# Patient Record
Sex: FEMALE | Race: BLACK OR AFRICAN AMERICAN | NOT HISPANIC OR LATINO | Employment: OTHER | URBAN - METROPOLITAN AREA
[De-identification: names, ages, dates, MRNs, and addresses within clinical notes are randomized per-mention and may not be internally consistent; named-entity substitution may affect disease eponyms.]

---

## 2023-03-07 LAB
ALANINE AMINOTRANSFERASE (SGPT) (U/L) IN SER/PLAS: 10 U/L (ref 7–45)
ALBUMIN (G/DL) IN SER/PLAS: 4.3 G/DL (ref 3.4–5)
ALKALINE PHOSPHATASE (U/L) IN SER/PLAS: 97 U/L (ref 33–136)
ANION GAP IN SER/PLAS: 10 MMOL/L (ref 10–20)
APPEARANCE, URINE: ABNORMAL
ASPARTATE AMINOTRANSFERASE (SGOT) (U/L) IN SER/PLAS: 17 U/L (ref 9–39)
BACTERIA, URINE: ABNORMAL /HPF
BILIRUBIN TOTAL (MG/DL) IN SER/PLAS: 0.3 MG/DL (ref 0–1.2)
BILIRUBIN, URINE: NEGATIVE
BLOOD, URINE: NEGATIVE
CALCIDIOL (25 OH VITAMIN D3) (NG/ML) IN SER/PLAS: 20 NG/ML
CALCIUM (MG/DL) IN SER/PLAS: 9.7 MG/DL (ref 8.6–10.3)
CARBON DIOXIDE, TOTAL (MMOL/L) IN SER/PLAS: 27 MMOL/L (ref 21–32)
CHLORIDE (MMOL/L) IN SER/PLAS: 107 MMOL/L (ref 98–107)
CHOLESTEROL (MG/DL) IN SER/PLAS: 193 MG/DL (ref 0–199)
CHOLESTEROL IN HDL (MG/DL) IN SER/PLAS: 68.4 MG/DL
CHOLESTEROL/HDL RATIO: 2.8
COLOR, URINE: YELLOW
CREATININE (MG/DL) IN SER/PLAS: 0.69 MG/DL (ref 0.5–1.05)
ERYTHROCYTE DISTRIBUTION WIDTH (RATIO) BY AUTOMATED COUNT: 19.8 % (ref 11.5–14.5)
ERYTHROCYTE MEAN CORPUSCULAR HEMOGLOBIN CONCENTRATION (G/DL) BY AUTOMATED: 28.2 G/DL (ref 32–36)
ERYTHROCYTE MEAN CORPUSCULAR VOLUME (FL) BY AUTOMATED COUNT: 74 FL (ref 80–100)
ERYTHROCYTES (10*6/UL) IN BLOOD BY AUTOMATED COUNT: 4.71 X10E12/L (ref 4–5.2)
GFR FEMALE: >90 ML/MIN/1.73M2
GLUCOSE (MG/DL) IN SER/PLAS: 87 MG/DL (ref 74–99)
GLUCOSE, URINE: NEGATIVE MG/DL
HEMATOCRIT (%) IN BLOOD BY AUTOMATED COUNT: 34.8 % (ref 36–46)
HEMOGLOBIN (G/DL) IN BLOOD: 9.8 G/DL (ref 12–16)
HYALINE CASTS, URINE: ABNORMAL /LPF
KETONES, URINE: NEGATIVE MG/DL
LDL: 111 MG/DL (ref 0–99)
LEUKOCYTE ESTERASE, URINE: NEGATIVE
LEUKOCYTES (10*3/UL) IN BLOOD BY AUTOMATED COUNT: 5.5 X10E9/L (ref 4.4–11.3)
NITRITE, URINE: NEGATIVE
PH, URINE: 5 (ref 5–8)
PLATELETS (10*3/UL) IN BLOOD AUTOMATED COUNT: 394 X10E9/L (ref 150–450)
POTASSIUM (MMOL/L) IN SER/PLAS: 3.9 MMOL/L (ref 3.5–5.3)
PROTEIN TOTAL: 7.5 G/DL (ref 6.4–8.2)
PROTEIN, URINE: ABNORMAL MG/DL
RBC, URINE: ABNORMAL /HPF (ref 0–5)
SODIUM (MMOL/L) IN SER/PLAS: 140 MMOL/L (ref 136–145)
SPECIFIC GRAVITY, URINE: 1.01 (ref 1–1.03)
SQUAMOUS EPITHELIAL CELLS, URINE: 1 /HPF
THYROTROPIN (MIU/L) IN SER/PLAS BY DETECTION LIMIT <= 0.05 MIU/L: 0.51 MIU/L (ref 0.44–3.98)
TRIGLYCERIDE (MG/DL) IN SER/PLAS: 69 MG/DL (ref 0–149)
UREA NITROGEN (MG/DL) IN SER/PLAS: 13 MG/DL (ref 6–23)
UROBILINOGEN, URINE: <2 MG/DL (ref 0–1.9)
VLDL: 14 MG/DL (ref 0–40)
WBC, URINE: 1 /HPF (ref 0–5)

## 2023-03-08 LAB
ESTIMATED AVERAGE GLUCOSE FOR HBA1C: 111 MG/DL
HEMOGLOBIN A1C/HEMOGLOBIN TOTAL IN BLOOD: 5.5 %

## 2023-10-08 PROBLEM — J34.89 NASAL LESION: Status: ACTIVE | Noted: 2023-10-08

## 2023-10-08 PROBLEM — J34.2 NASAL SEPTAL DEVIATION: Status: ACTIVE | Noted: 2023-10-08

## 2023-10-08 PROBLEM — J34.89 NASAL OBSTRUCTION: Status: ACTIVE | Noted: 2023-10-08

## 2023-10-08 PROBLEM — J34.3 HYPERTROPHY OF BOTH INFERIOR NASAL TURBINATES: Status: ACTIVE | Noted: 2023-10-08

## 2023-10-08 PROBLEM — J32.9 CHRONIC RHINOSINUSITIS: Status: ACTIVE | Noted: 2023-10-08

## 2023-10-08 PROBLEM — R44.8 FACIAL PRESSURE: Status: ACTIVE | Noted: 2023-10-08

## 2023-10-08 PROBLEM — J34.89 CONCHA BULLOSA: Status: ACTIVE | Noted: 2023-10-08

## 2023-10-08 RX ORDER — TOPIRAMATE 50 MG/1
50 TABLET, FILM COATED ORAL 2 TIMES DAILY
COMMUNITY
Start: 2020-09-24 | End: 2024-02-23 | Stop reason: ALTCHOICE

## 2023-10-08 RX ORDER — CETIRIZINE HYDROCHLORIDE 10 MG/1
10 TABLET ORAL DAILY
COMMUNITY
Start: 2022-02-17

## 2023-10-08 RX ORDER — PANTOPRAZOLE SODIUM 40 MG/1
40 TABLET, DELAYED RELEASE ORAL DAILY
COMMUNITY
Start: 2019-07-03

## 2023-10-08 RX ORDER — TIZANIDINE 4 MG/1
4 TABLET ORAL DAILY
COMMUNITY
Start: 2019-01-02 | End: 2023-11-10 | Stop reason: SDUPTHER

## 2023-10-08 RX ORDER — MONTELUKAST SODIUM 10 MG/1
10 TABLET ORAL DAILY
COMMUNITY
Start: 2022-02-15

## 2023-10-08 RX ORDER — VARENICLINE TARTRATE 0.5 (11)-1
KIT ORAL
COMMUNITY
Start: 2021-12-14 | End: 2024-02-23 | Stop reason: ALTCHOICE

## 2023-10-08 RX ORDER — CHOLECALCIFEROL (VITAMIN D3) 50 MCG
2000 TABLET ORAL DAILY
COMMUNITY
Start: 2018-10-04

## 2023-10-08 RX ORDER — GABAPENTIN 800 MG/1
800 TABLET ORAL 3 TIMES DAILY
COMMUNITY
Start: 2022-02-15 | End: 2023-11-10 | Stop reason: DRUGHIGH

## 2023-10-08 RX ORDER — TRAMADOL HYDROCHLORIDE 50 MG/1
50 TABLET ORAL 2 TIMES DAILY PRN
COMMUNITY
Start: 2022-03-28 | End: 2023-10-10 | Stop reason: SDUPTHER

## 2023-10-08 RX ORDER — TEMAZEPAM 15 MG/1
CAPSULE ORAL NIGHTLY
COMMUNITY
Start: 2018-10-04 | End: 2024-03-26 | Stop reason: SDUPTHER

## 2023-10-08 RX ORDER — ALBUTEROL SULFATE 90 UG/1
2 AEROSOL, METERED RESPIRATORY (INHALATION) EVERY 6 HOURS PRN
COMMUNITY
Start: 2020-07-24

## 2023-10-08 RX ORDER — OXYBUTYNIN CHLORIDE 5 MG/1
5 TABLET, EXTENDED RELEASE ORAL DAILY
COMMUNITY
Start: 2018-10-04

## 2023-10-08 RX ORDER — SOD CHLOR,BICARB/SQUEEZ BOTTLE
PACKET, WITH RINSE DEVICE NASAL 2 TIMES DAILY
COMMUNITY
Start: 2022-03-29

## 2023-10-08 RX ORDER — NAPROXEN 500 MG
1 KIT MISCELLANEOUS 2 TIMES DAILY
COMMUNITY
Start: 2022-12-04

## 2023-10-08 RX ORDER — FERROUS SULFATE 325(65) MG
TABLET ORAL DAILY
COMMUNITY
Start: 2018-10-04

## 2023-10-08 RX ORDER — LIDOCAINE 50 MG/G
1 PATCH TOPICAL DAILY
COMMUNITY
Start: 2022-12-04

## 2023-10-08 RX ORDER — FLUTICASONE PROPIONATE 50 MCG
2 SPRAY, SUSPENSION (ML) NASAL DAILY
COMMUNITY
Start: 2022-03-29

## 2023-10-08 RX ORDER — VARENICLINE TARTRATE 1 MG/1
TABLET, FILM COATED ORAL
COMMUNITY
Start: 2021-12-14 | End: 2024-05-15 | Stop reason: SDUPTHER

## 2023-10-08 RX ORDER — IBUPROFEN 200 MG
1 TABLET ORAL DAILY
COMMUNITY
Start: 2021-08-02

## 2023-10-08 RX ORDER — OXYCODONE HYDROCHLORIDE 5 MG/1
5 TABLET ORAL EVERY 4 HOURS PRN
COMMUNITY
Start: 2022-09-26 | End: 2024-04-15 | Stop reason: ALTCHOICE

## 2023-10-10 ENCOUNTER — TELEPHONE (OUTPATIENT)
Dept: PRIMARY CARE | Facility: CLINIC | Age: 63
End: 2023-10-10

## 2023-10-10 ENCOUNTER — LAB (OUTPATIENT)
Dept: LAB | Facility: LAB | Age: 63
End: 2023-10-10
Payer: MEDICAID

## 2023-10-10 ENCOUNTER — OFFICE VISIT (OUTPATIENT)
Dept: PRIMARY CARE | Facility: CLINIC | Age: 63
End: 2023-10-10
Payer: MEDICAID

## 2023-10-10 VITALS
SYSTOLIC BLOOD PRESSURE: 120 MMHG | HEART RATE: 102 BPM | WEIGHT: 95.8 LBS | DIASTOLIC BLOOD PRESSURE: 70 MMHG | TEMPERATURE: 98.1 F | HEIGHT: 65 IN | BODY MASS INDEX: 15.96 KG/M2 | OXYGEN SATURATION: 97 %

## 2023-10-10 DIAGNOSIS — M51.36 DEGENERATION, INTERVERTEBRAL DISC, LUMBAR: ICD-10-CM

## 2023-10-10 DIAGNOSIS — J43.9 PULMONARY EMPHYSEMA, UNSPECIFIED EMPHYSEMA TYPE (MULTI): Primary | ICD-10-CM

## 2023-10-10 DIAGNOSIS — M15.9 POLYARTHROSIS: ICD-10-CM

## 2023-10-10 DIAGNOSIS — M17.0 OSTEOARTHRITIS OF BOTH KNEES, UNSPECIFIED OSTEOARTHRITIS TYPE: ICD-10-CM

## 2023-10-10 DIAGNOSIS — D50.9 IRON DEFICIENCY ANEMIA, UNSPECIFIED: Primary | ICD-10-CM

## 2023-10-10 LAB
ERYTHROCYTE [DISTWIDTH] IN BLOOD BY AUTOMATED COUNT: 23.9 % (ref 11.5–14.5)
FERRITIN SERPL-MCNC: 25 NG/ML (ref 8–150)
HCT VFR BLD AUTO: 42.3 % (ref 36–46)
HGB BLD-MCNC: 12.5 G/DL (ref 12–16)
HGB RETIC QN: 29 PG (ref 28–38)
IMMATURE RETIC FRACTION: 7.1 %
IRON SATN MFR SERPL: ABNORMAL %
IRON SERPL-MCNC: 32 UG/DL (ref 35–150)
MCH RBC QN AUTO: 25.9 PG (ref 26–34)
MCHC RBC AUTO-ENTMCNC: 29.6 G/DL (ref 32–36)
MCV RBC AUTO: 88 FL (ref 80–100)
NRBC BLD-RTO: 0 /100 WBCS (ref 0–0)
PLATELET # BLD AUTO: 383 X10*3/UL (ref 150–450)
PMV BLD AUTO: 9.7 FL (ref 7.5–11.5)
RBC # BLD AUTO: 4.82 X10*6/UL (ref 4–5.2)
RETICS #: 0.05 X10*6/UL (ref 0.02–0.08)
RETICS/RBC NFR AUTO: 1 % (ref 0.5–2)
TIBC SERPL-MCNC: ABNORMAL UG/DL
UIBC SERPL-MCNC: >450 UG/DL (ref 110–370)
WBC # BLD AUTO: 7 X10*3/UL (ref 4.4–11.3)

## 2023-10-10 PROCEDURE — 83540 ASSAY OF IRON: CPT

## 2023-10-10 PROCEDURE — 83550 IRON BINDING TEST: CPT

## 2023-10-10 PROCEDURE — 36415 COLL VENOUS BLD VENIPUNCTURE: CPT

## 2023-10-10 PROCEDURE — 85027 COMPLETE CBC AUTOMATED: CPT

## 2023-10-10 PROCEDURE — 82728 ASSAY OF FERRITIN: CPT

## 2023-10-10 PROCEDURE — 99214 OFFICE O/P EST MOD 30 MIN: CPT | Performed by: INTERNAL MEDICINE

## 2023-10-10 PROCEDURE — 85045 AUTOMATED RETICULOCYTE COUNT: CPT

## 2023-10-10 RX ORDER — TRAMADOL HYDROCHLORIDE 50 MG/1
50 TABLET ORAL 2 TIMES DAILY PRN
Qty: 60 TABLET | Refills: 0 | Status: SHIPPED | OUTPATIENT
Start: 2023-10-10 | End: 2023-10-11 | Stop reason: SDUPTHER

## 2023-10-10 ASSESSMENT — ENCOUNTER SYMPTOMS
PALPITATIONS: 0
COUGH: 0
PSYCHIATRIC NEGATIVE: 1
NUMBNESS: 1
ACTIVITY CHANGE: 0
ENDOCRINE NEGATIVE: 1
ARTHRALGIAS: 1
ABDOMINAL PAIN: 0
ALLERGIC/IMMUNOLOGIC NEGATIVE: 1
SHORTNESS OF BREATH: 0
DYSURIA: 0
BLOOD IN STOOL: 0
FREQUENCY: 0
ADENOPATHY: 0
DIZZINESS: 0
CONSTIPATION: 0
FEVER: 0

## 2023-10-10 NOTE — PROGRESS NOTES
"Subjective   Patient ID: Mariann Mock is a 63 y.o. female who presents for Follow-up.    She is doing well, feels fine.She denies fever , nausea , sputum , dyspnea , dizziness , palpitations.She is taking care of many grandchildren. She has familial stress .She does not want epidural inj. in her back No dysuria , bleeding. She has some constipation , otc meds help.  She has diffuse musculoskeletal pains. She  had previous knee surgery by Dr Simeon.She takes gabapentin daily for numbness and paresthesia. She has mild urinary incontinence.  She takes tramadol prn for  moderate pain  She had CT chest , which shows evidence of emphysema, she has long h/o smoking.  She is actively trying to quit smoking, down to 1 cig / week  and will see Pulmonary at [facility] .   She has anemia, and advised to take Iron po daily and seen by NP of  Dr Leary for colonoscopy  on 7/06/23 , She is scheduled for EGD and Colonoscopy , seperately.           Review of Systems   Constitutional:  Negative for activity change and fever.   HENT:  Negative for congestion.    Eyes:  Negative for visual disturbance.   Respiratory:  Negative for cough and shortness of breath.    Cardiovascular:  Negative for chest pain, palpitations and leg swelling.   Gastrointestinal:  Negative for abdominal pain, blood in stool and constipation.        She has reflux symptoms / GERD   Endocrine: Negative.    Genitourinary:  Negative for dysuria and frequency.   Musculoskeletal:  Positive for arthralgias.        Diffuse joint pains, and h/o bilateral TKA - Dr Simeon   Skin:  Negative for rash.   Allergic/Immunologic: Negative.    Neurological:  Positive for numbness. Negative for dizziness.        Paresthesia, takes gabapentin   Hematological:  Negative for adenopathy.   Psychiatric/Behavioral: Negative.         Objective   /70 (BP Location: Left arm, Patient Position: Sitting, BP Cuff Size: Adult)   Pulse 102   Temp 36.7 °C (98.1 °F)   Ht 1.651 m (5' 5\")   Wt (!) " 43.5 kg (95 lb 12.8 oz)   SpO2 97%   BMI 15.94 kg/m²     Physical Exam  Constitutional:       Appearance: Normal appearance.   HENT:      Nose: No congestion.      Mouth/Throat:      Mouth: Mucous membranes are moist.      Pharynx: Oropharynx is clear.      Comments: Mucosa is pale  Eyes:      Conjunctiva/sclera: Conjunctivae normal.   Cardiovascular:      Rate and Rhythm: Normal rate and regular rhythm.      Pulses: Normal pulses.      Heart sounds: Normal heart sounds. No murmur heard.  Pulmonary:      Effort: Pulmonary effort is normal.      Breath sounds: Normal breath sounds.   Abdominal:      General: Abdomen is flat.      Palpations: Abdomen is soft.      Tenderness: There is no abdominal tenderness.   Musculoskeletal:         General: Normal range of motion.      Cervical back: Normal range of motion and neck supple.      Right lower leg: No edema.      Left lower leg: No edema.   Skin:     General: Skin is warm and dry.      Findings: No rash.   Neurological:      General: No focal deficit present.      Mental Status: She is alert and oriented to person, place, and time.   Psychiatric:         Mood and Affect: Mood normal.         Behavior: Behavior normal.         Assessment/Plan     Lumbar degenerative disk disease:    pain control , tramadol prn , refilled , # 60   OARRS reviewed  otc analgesics , Tizanidine prn  s/p  Lumbar spine surgery on 4/19/22      Fibromyalgia:  otc analgesics , Tramadol  increase activity,exercises  rec. PT and water therapy , she goes to [facility]     Bilateral knee OA  S/p TKA  Activity as tolerated  Pain control    Polyneuropathy:  Continue Gabapentin    COPD :  Proair inhaler   Add Trelegy inhaler 1 puff once daily  PFT's  advised  CT Chest reviewed   Pulmonology consult, Dr Martir Valencia at [facility]    Iron deficiency anemia:  Iron po daily  Follow  Iron indices  EGD and Colonoscopy per Dr Leary     Nicotine dependence:  stop smoking

## 2023-10-11 DIAGNOSIS — M15.9 POLYARTHROSIS: ICD-10-CM

## 2023-10-11 DIAGNOSIS — M51.36 DEGENERATION, INTERVERTEBRAL DISC, LUMBAR: ICD-10-CM

## 2023-10-11 DIAGNOSIS — J43.9 PULMONARY EMPHYSEMA, UNSPECIFIED EMPHYSEMA TYPE (MULTI): ICD-10-CM

## 2023-10-11 RX ORDER — TRAMADOL HYDROCHLORIDE 50 MG/1
50 TABLET ORAL 2 TIMES DAILY PRN
Qty: 60 TABLET | Refills: 0 | Status: SHIPPED | OUTPATIENT
Start: 2023-10-11 | End: 2023-11-10 | Stop reason: SDUPTHER

## 2023-11-10 ENCOUNTER — OFFICE VISIT (OUTPATIENT)
Dept: PRIMARY CARE | Facility: CLINIC | Age: 63
End: 2023-11-10
Payer: MEDICAID

## 2023-11-10 VITALS
SYSTOLIC BLOOD PRESSURE: 118 MMHG | DIASTOLIC BLOOD PRESSURE: 80 MMHG | HEIGHT: 65 IN | BODY MASS INDEX: 15.83 KG/M2 | WEIGHT: 95 LBS

## 2023-11-10 DIAGNOSIS — J43.9 PULMONARY EMPHYSEMA, UNSPECIFIED EMPHYSEMA TYPE (MULTI): ICD-10-CM

## 2023-11-10 DIAGNOSIS — M15.9 POLYARTHROSIS: ICD-10-CM

## 2023-11-10 DIAGNOSIS — G62.9 NEUROPATHY: Primary | ICD-10-CM

## 2023-11-10 DIAGNOSIS — M51.36 DEGENERATION, INTERVERTEBRAL DISC, LUMBAR: ICD-10-CM

## 2023-11-10 PROCEDURE — 99214 OFFICE O/P EST MOD 30 MIN: CPT | Performed by: INTERNAL MEDICINE

## 2023-11-10 PROCEDURE — 4004F PT TOBACCO SCREEN RCVD TLK: CPT | Performed by: INTERNAL MEDICINE

## 2023-11-10 RX ORDER — GABAPENTIN 600 MG/1
600 TABLET ORAL 3 TIMES DAILY
Qty: 90 TABLET | Refills: 1 | Status: SHIPPED | OUTPATIENT
Start: 2023-12-01 | End: 2024-01-16 | Stop reason: SDUPTHER

## 2023-11-10 RX ORDER — TRAMADOL HYDROCHLORIDE 50 MG/1
50 TABLET ORAL 2 TIMES DAILY PRN
Qty: 60 TABLET | Refills: 0 | Status: SHIPPED | OUTPATIENT
Start: 2023-11-10 | End: 2023-12-26 | Stop reason: SDUPTHER

## 2023-11-10 RX ORDER — TIZANIDINE 4 MG/1
4 TABLET ORAL DAILY
Qty: 30 TABLET | Refills: 1 | Status: SHIPPED | OUTPATIENT
Start: 2023-11-10 | End: 2024-01-16 | Stop reason: SDUPTHER

## 2023-11-10 RX ORDER — GABAPENTIN 600 MG/1
600 TABLET ORAL 3 TIMES DAILY
Qty: 90 TABLET | Refills: 1 | Status: SHIPPED | OUTPATIENT
Start: 2023-11-10 | End: 2023-11-10 | Stop reason: SDUPTHER

## 2023-11-10 ASSESSMENT — ENCOUNTER SYMPTOMS
AGITATION: 0
ADENOPATHY: 0
CONSTIPATION: 0
BLOOD IN STOOL: 0
FATIGUE: 0
COUGH: 0
DIZZINESS: 0
HEADACHES: 0
DYSURIA: 0
FREQUENCY: 0
ENDOCRINE NEGATIVE: 1
EYE REDNESS: 0
ABDOMINAL PAIN: 0
WHEEZING: 0
JOINT SWELLING: 0
ALLERGIC/IMMUNOLOGIC NEGATIVE: 1
WEAKNESS: 0
ACTIVITY CHANGE: 0
FEVER: 0
BACK PAIN: 1
SHORTNESS OF BREATH: 0
PALPITATIONS: 0
ARTHRALGIAS: 1
NUMBNESS: 0

## 2023-12-19 PROCEDURE — 88305 TISSUE EXAM BY PATHOLOGIST: CPT

## 2023-12-19 PROCEDURE — 88305 TISSUE EXAM BY PATHOLOGIST: CPT | Performed by: PATHOLOGY

## 2023-12-26 ENCOUNTER — OFFICE VISIT (OUTPATIENT)
Dept: PRIMARY CARE | Facility: CLINIC | Age: 63
End: 2023-12-26
Payer: MEDICAID

## 2023-12-26 ENCOUNTER — LAB REQUISITION (OUTPATIENT)
Dept: LAB | Facility: HOSPITAL | Age: 63
End: 2023-12-26
Payer: MEDICAID

## 2023-12-26 VITALS
DIASTOLIC BLOOD PRESSURE: 60 MMHG | WEIGHT: 105 LBS | HEIGHT: 65 IN | RESPIRATION RATE: 16 BRPM | BODY MASS INDEX: 17.49 KG/M2 | SYSTOLIC BLOOD PRESSURE: 110 MMHG | TEMPERATURE: 97.5 F | OXYGEN SATURATION: 95 % | HEART RATE: 81 BPM

## 2023-12-26 DIAGNOSIS — G62.9 NEUROPATHY: ICD-10-CM

## 2023-12-26 DIAGNOSIS — M15.9 POLYARTHROSIS: ICD-10-CM

## 2023-12-26 DIAGNOSIS — K21.9 GASTRO-ESOPHAGEAL REFLUX DISEASE WITHOUT ESOPHAGITIS: ICD-10-CM

## 2023-12-26 DIAGNOSIS — J43.9 PULMONARY EMPHYSEMA, UNSPECIFIED EMPHYSEMA TYPE (MULTI): ICD-10-CM

## 2023-12-26 DIAGNOSIS — M51.36 DEGENERATION, INTERVERTEBRAL DISC, LUMBAR: ICD-10-CM

## 2023-12-26 PROCEDURE — 99214 OFFICE O/P EST MOD 30 MIN: CPT | Performed by: INTERNAL MEDICINE

## 2023-12-26 PROCEDURE — 4004F PT TOBACCO SCREEN RCVD TLK: CPT | Performed by: INTERNAL MEDICINE

## 2023-12-26 RX ORDER — TRAMADOL HYDROCHLORIDE 50 MG/1
50 TABLET ORAL 2 TIMES DAILY PRN
Qty: 60 TABLET | Refills: 0 | Status: SHIPPED | OUTPATIENT
Start: 2023-12-26 | End: 2024-01-26 | Stop reason: SDUPTHER

## 2023-12-26 ASSESSMENT — ENCOUNTER SYMPTOMS
FEVER: 0
COUGH: 0
DIZZINESS: 0
FATIGUE: 0
DYSURIA: 0
ENDOCRINE NEGATIVE: 1
ARTHRALGIAS: 1
ACTIVITY CHANGE: 0
PALPITATIONS: 0
SHORTNESS OF BREATH: 0
ALLERGIC/IMMUNOLOGIC NEGATIVE: 1
ABDOMINAL PAIN: 0
NUMBNESS: 0
BACK PAIN: 1
JOINT SWELLING: 0
FREQUENCY: 0
WEAKNESS: 0
HEADACHES: 0
AGITATION: 0
BLOOD IN STOOL: 0
ADENOPATHY: 0
EYE REDNESS: 0
WHEEZING: 0
CONSTIPATION: 0

## 2023-12-26 NOTE — PROGRESS NOTES
Subjective   Patient ID: Mariann Mock is a 63 y.o. female who presents for No chief complaint on file..    She is doing well, feels fine.She denies fever , nausea , sputum , dyspnea , dizziness , palpitations.She is taking care of many grandchildren. She has familial stress .She does not want epidural inj. in her back No dysuria , bleeding. She has some constipation , otc meds help.  She has diffuse musculoskeletal pains. She  had previous knee surgery by Dr Simeon.She takes gabapentin daily for numbness and paresthesia. She has mild urinary incontinence.  She takes tramadol prn for  moderate pain  She had CT chest , which shows evidence of emphysema, she has long h/o smoking.  She is actively trying to quit smoking, down to 1 cig / week  and should  see Pulmonary at [facility] .   She has anemia, and advised to take Iron po daily and seen by NP of  Dr Leary on 7/06/23 ,   She had EGD on 12/19 which showed mild antral erythema, and Colonoscopy scheduled on 01/24         Review of Systems   Constitutional:  Negative for activity change, fatigue and fever.   HENT:  Negative for congestion.    Eyes:  Negative for redness and visual disturbance.   Respiratory:  Negative for cough, shortness of breath and wheezing.    Cardiovascular:  Negative for chest pain, palpitations and leg swelling.   Gastrointestinal:  Negative for abdominal pain, blood in stool and constipation.   Endocrine: Negative.    Genitourinary:  Negative for dysuria, frequency and urgency.   Musculoskeletal:  Positive for arthralgias and back pain. Negative for joint swelling.   Skin:  Negative for rash.   Allergic/Immunologic: Negative.    Neurological:  Negative for dizziness, weakness, numbness and headaches.   Hematological:  Negative for adenopathy.   Psychiatric/Behavioral:  Negative for agitation and behavioral problems.        Objective   There were no vitals taken for this visit.    Physical Exam  Constitutional:       Appearance: Normal appearance.    HENT:      Head: Normocephalic and atraumatic.      Right Ear: Tympanic membrane and external ear normal.      Left Ear: Tympanic membrane and external ear normal.      Nose: No congestion.      Mouth/Throat:      Mouth: Mucous membranes are moist.      Pharynx: Oropharynx is clear.   Eyes:      Extraocular Movements: Extraocular movements intact.      Conjunctiva/sclera: Conjunctivae normal.      Pupils: Pupils are equal, round, and reactive to light.   Cardiovascular:      Rate and Rhythm: Normal rate and regular rhythm.      Pulses: Normal pulses.      Heart sounds: Normal heart sounds. No murmur heard.  Pulmonary:      Effort: Pulmonary effort is normal.      Breath sounds: Normal breath sounds. No wheezing or rales.   Abdominal:      General: Abdomen is flat. Bowel sounds are normal.      Palpations: Abdomen is soft.      Hernia: No hernia is present.   Musculoskeletal:         General: No swelling or tenderness. Normal range of motion.      Cervical back: Normal range of motion and neck supple.      Right lower leg: No edema.      Left lower leg: No edema.   Skin:     General: Skin is warm and dry.      Capillary Refill: Capillary refill takes less than 2 seconds.      Findings: No rash.   Neurological:      General: No focal deficit present.      Mental Status: She is alert and oriented to person, place, and time.   Psychiatric:         Mood and Affect: Mood normal.         Behavior: Behavior normal.         Assessment/Plan        Lumbar Degenerative Disk Disease:  pain control , tramadol prn , refilled , # 60   OARRS reviewed  otc analgesics , Tizanidine prn  s/p  Lumbar spine surgery on 4/19/22      Fibromyalgia:  otc analgesics  increase activity,exercises  rec. PT and water therapy , she goes to [facility]    Knee joint OA:  S/p bilateral TKA  Orthopedic followup  with   Dr Simeon at [facility]   pain control , regular activity    no current issues , pain or mobility         Polyneuropathy:  Reduce  gabapentin to 600mg po tid    COPD:   Proair inhaler  PFT's  advised  CT Chest reviewed   Pulmonology consult, Dr Martir Valencia at [facility]  stop smoking     Nicotine dependence:     :  smoking cessation , counselled again  FH : Mom  of lung CA     Iron Deficiency Anemia:    Iron po   EGD and Colonoscopy per Dr Leary as above

## 2024-01-03 LAB
LABORATORY COMMENT REPORT: NORMAL
PATH REPORT.FINAL DX SPEC: NORMAL
PATH REPORT.GROSS SPEC: NORMAL
PATH REPORT.RELEVANT HX SPEC: NORMAL
PATH REPORT.TOTAL CANCER: NORMAL

## 2024-01-16 DIAGNOSIS — G62.9 NEUROPATHY: ICD-10-CM

## 2024-01-16 DIAGNOSIS — M51.36 DEGENERATION, INTERVERTEBRAL DISC, LUMBAR: ICD-10-CM

## 2024-01-16 DIAGNOSIS — M15.9 POLYARTHROSIS: ICD-10-CM

## 2024-01-16 RX ORDER — GABAPENTIN 600 MG/1
600 TABLET ORAL 3 TIMES DAILY
Qty: 90 TABLET | Refills: 0 | Status: SHIPPED | OUTPATIENT
Start: 2024-01-16 | End: 2024-01-26 | Stop reason: SDUPTHER

## 2024-01-16 RX ORDER — TIZANIDINE 4 MG/1
4 TABLET ORAL DAILY
Qty: 30 TABLET | Refills: 0 | Status: SHIPPED | OUTPATIENT
Start: 2024-01-16 | End: 2024-02-23 | Stop reason: SDUPTHER

## 2024-01-26 ENCOUNTER — OFFICE VISIT (OUTPATIENT)
Dept: PRIMARY CARE | Facility: CLINIC | Age: 64
End: 2024-01-26
Payer: MEDICAID

## 2024-01-26 VITALS
HEART RATE: 83 BPM | TEMPERATURE: 97.3 F | HEIGHT: 65 IN | OXYGEN SATURATION: 98 % | SYSTOLIC BLOOD PRESSURE: 124 MMHG | BODY MASS INDEX: 16.16 KG/M2 | WEIGHT: 97 LBS | DIASTOLIC BLOOD PRESSURE: 70 MMHG | RESPIRATION RATE: 16 BRPM

## 2024-01-26 DIAGNOSIS — M51.36 DEGENERATION, INTERVERTEBRAL DISC, LUMBAR: ICD-10-CM

## 2024-01-26 DIAGNOSIS — M15.9 POLYARTHROSIS: ICD-10-CM

## 2024-01-26 DIAGNOSIS — G62.9 NEUROPATHY: ICD-10-CM

## 2024-01-26 DIAGNOSIS — J43.9 PULMONARY EMPHYSEMA, UNSPECIFIED EMPHYSEMA TYPE (MULTI): ICD-10-CM

## 2024-01-26 PROCEDURE — 99214 OFFICE O/P EST MOD 30 MIN: CPT | Performed by: INTERNAL MEDICINE

## 2024-01-26 RX ORDER — GABAPENTIN 600 MG/1
600 TABLET ORAL 3 TIMES DAILY
Qty: 90 TABLET | Refills: 2 | Status: SHIPPED | OUTPATIENT
Start: 2024-01-26 | End: 2024-05-15 | Stop reason: SDUPTHER

## 2024-01-26 RX ORDER — TRAMADOL HYDROCHLORIDE 50 MG/1
50 TABLET ORAL 2 TIMES DAILY PRN
Qty: 60 TABLET | Refills: 0 | Status: SHIPPED | OUTPATIENT
Start: 2024-01-26 | End: 2024-02-23 | Stop reason: SDUPTHER

## 2024-01-26 ASSESSMENT — ENCOUNTER SYMPTOMS
DIZZINESS: 0
ABDOMINAL PAIN: 0
DYSURIA: 0
CONSTIPATION: 0
ARTHRALGIAS: 1
ACTIVITY CHANGE: 0
PALPITATIONS: 0
ALLERGIC/IMMUNOLOGIC NEGATIVE: 1
JOINT SWELLING: 0
WHEEZING: 0
FATIGUE: 0
SHORTNESS OF BREATH: 0
COUGH: 0
EYE REDNESS: 0
BLOOD IN STOOL: 0
WEAKNESS: 0
ENDOCRINE NEGATIVE: 1
HEADACHES: 0
BACK PAIN: 1
ADENOPATHY: 0
FEVER: 0
FREQUENCY: 0
AGITATION: 0
NUMBNESS: 0

## 2024-01-26 NOTE — PROGRESS NOTES
Subjective   Patient ID: Mariann Mock is a 63 y.o. female who presents for 1 mo follow up, Hand Pain (Growth possible wart ), and L Shoulder Pain .    She is doing well, feels fine.She denies fever , nausea , sputum , dyspnea , dizziness , palpitations.She is taking care of many grandchildren. She has familial stress .She does not want epidural inj. in her back No dysuria , bleeding. She has some constipation , otc meds help.  She has diffuse musculoskeletal pains. She  had previous knee surgery by Dr Simeon.She takes gabapentin daily for numbness and paresthesia. She has mild urinary incontinence.  She takes tramadol prn for  moderate pain  She had CT chest , which shows evidence of emphysema, she has long h/o smoking.  She is actively trying to quit smoking, down to 2 cig / week  and should  see Pulmonary at [facility] .   She has anemia, and advised to take Iron po daily .   She had EGD  which showed mild antral erythema, and Colonoscopy scheduled on 01/31/24    Hand Pain   Pertinent negatives include no chest pain or numbness.        Review of Systems   Constitutional:  Negative for activity change, fatigue and fever.   HENT:  Negative for congestion.    Eyes:  Negative for redness and visual disturbance.   Respiratory:  Negative for cough, shortness of breath and wheezing.    Cardiovascular:  Negative for chest pain, palpitations and leg swelling.   Gastrointestinal:  Negative for abdominal pain, blood in stool and constipation.   Endocrine: Negative.    Genitourinary:  Negative for dysuria, frequency and urgency.   Musculoskeletal:  Positive for arthralgias and back pain. Negative for joint swelling.   Skin:  Negative for rash.   Allergic/Immunologic: Negative.    Neurological:  Negative for dizziness, weakness, numbness and headaches.   Hematological:  Negative for adenopathy.   Psychiatric/Behavioral:  Negative for agitation and behavioral problems.        Objective   /70 (BP Location: Left arm, Patient  "Position: Sitting, BP Cuff Size: Adult)   Pulse 83   Temp 36.3 °C (97.3 °F) (Temporal)   Resp 16   Ht 1.651 m (5' 5\")   Wt (!) 44 kg (97 lb)   SpO2 98%   BMI 16.14 kg/m²     Physical Exam  Constitutional:       Appearance: Normal appearance.   HENT:      Head: Normocephalic and atraumatic.      Right Ear: Tympanic membrane and external ear normal.      Left Ear: Tympanic membrane and external ear normal.      Nose: No congestion.      Mouth/Throat:      Mouth: Mucous membranes are moist.      Pharynx: Oropharynx is clear.   Eyes:      Extraocular Movements: Extraocular movements intact.      Conjunctiva/sclera: Conjunctivae normal.      Pupils: Pupils are equal, round, and reactive to light.   Cardiovascular:      Rate and Rhythm: Normal rate and regular rhythm.      Pulses: Normal pulses.      Heart sounds: Normal heart sounds. No murmur heard.  Pulmonary:      Effort: Pulmonary effort is normal.      Breath sounds: Normal breath sounds. No wheezing or rales.   Abdominal:      General: Abdomen is flat. Bowel sounds are normal.      Palpations: Abdomen is soft.      Hernia: No hernia is present.   Musculoskeletal:         General: No swelling or tenderness. Normal range of motion.      Cervical back: Normal range of motion and neck supple.      Right lower leg: No edema.      Left lower leg: No edema.   Skin:     General: Skin is warm and dry.      Capillary Refill: Capillary refill takes less than 2 seconds.      Findings: No rash.   Neurological:      General: No focal deficit present.      Mental Status: She is alert and oriented to person, place, and time.   Psychiatric:         Mood and Affect: Mood normal.         Behavior: Behavior normal.         Assessment/Plan        Lumbar Degenerative Disk Disease:  pain control , tramadol prn , refilled , # 60   OARRS reviewed  otc analgesics , Tizanidine prn  s/p  Lumbar spine surgery on 4/19/22      Fibromyalgia:  otc analgesics  increase " activity,exercises  rec. PT and water therapy , she goes to [facility]    Knee joint OA:  S/p bilateral TKA  Orthopedic followup  with   Dr Simeon at [facility]   pain control , regular activity    no current issues , pain or mobility         Polyneuropathy:  Reduce gabapentin to 600mg po tid    COPD:   Proair inhaler  PFT's  advised  CT Chest reviewed   Pulmonology consult, Dr Martir Valencia at [facility]  stop smoking     Nicotine dependence:     :  smoking cessation , counselled again  FH : Mom  of lung CA     Iron Deficiency Anemia:    Iron po   EGD and Colonoscopy per Dr Leary as above

## 2024-02-23 ENCOUNTER — OFFICE VISIT (OUTPATIENT)
Dept: PRIMARY CARE | Facility: CLINIC | Age: 64
End: 2024-02-23
Payer: MEDICAID

## 2024-02-23 VITALS
DIASTOLIC BLOOD PRESSURE: 70 MMHG | BODY MASS INDEX: 16.2 KG/M2 | SYSTOLIC BLOOD PRESSURE: 100 MMHG | OXYGEN SATURATION: 98 % | WEIGHT: 97.34 LBS | TEMPERATURE: 97.5 F | HEART RATE: 92 BPM

## 2024-02-23 DIAGNOSIS — M15.9 POLYARTHROSIS: ICD-10-CM

## 2024-02-23 DIAGNOSIS — J43.9 PULMONARY EMPHYSEMA, UNSPECIFIED EMPHYSEMA TYPE (MULTI): ICD-10-CM

## 2024-02-23 DIAGNOSIS — M51.36 DEGENERATION, INTERVERTEBRAL DISC, LUMBAR: ICD-10-CM

## 2024-02-23 PROCEDURE — 1159F MED LIST DOCD IN RCRD: CPT | Performed by: INTERNAL MEDICINE

## 2024-02-23 PROCEDURE — 99214 OFFICE O/P EST MOD 30 MIN: CPT | Performed by: INTERNAL MEDICINE

## 2024-02-23 RX ORDER — TRAMADOL HYDROCHLORIDE 50 MG/1
50 TABLET ORAL 2 TIMES DAILY PRN
Qty: 60 TABLET | Refills: 0 | Status: SHIPPED | OUTPATIENT
Start: 2024-02-23 | End: 2024-04-15 | Stop reason: SDUPTHER

## 2024-02-23 RX ORDER — TIZANIDINE 4 MG/1
4 TABLET ORAL DAILY
Qty: 30 TABLET | Refills: 0 | Status: SHIPPED | OUTPATIENT
Start: 2024-02-23 | End: 2024-04-15 | Stop reason: SDUPTHER

## 2024-02-23 ASSESSMENT — ENCOUNTER SYMPTOMS
ARTHRALGIAS: 1
BLOOD IN STOOL: 0
AGITATION: 0
HEADACHES: 0
EYE REDNESS: 0
PALPITATIONS: 0
COUGH: 0
ACTIVITY CHANGE: 0
DYSURIA: 0
FREQUENCY: 0
FATIGUE: 0
ALLERGIC/IMMUNOLOGIC NEGATIVE: 1
JOINT SWELLING: 0
WEAKNESS: 0
BACK PAIN: 1
CONSTIPATION: 0
ADENOPATHY: 0
DIZZINESS: 0
FEVER: 0
ABDOMINAL PAIN: 0
SHORTNESS OF BREATH: 0
WHEEZING: 0
ENDOCRINE NEGATIVE: 1

## 2024-02-23 NOTE — PROGRESS NOTES
Subjective   Patient ID: Mariann Mock is a 64 y.o. female who presents for No chief complaint on file..    She is doing well, feels fine.She denies fever , nausea , sputum , dyspnea , dizziness , palpitations.She is taking care of many grandchildren. She has familial stress .She does not want epidural inj. in her back No dysuria , bleeding. She has some constipation , otc meds help.  She has diffuse musculoskeletal pains. She  had previous knee surgery by Dr Simeon.She takes gabapentin daily for numbness and paresthesia. She has mild urinary incontinence.  She takes tramadol prn for  moderate pain  She had CT chest , which shows evidence of emphysema, she has long h/o smoking.  She is actively trying to quit smoking, down to 2 cig / week  and should  see Pulmonary at [facility] .   She has anemia, and advised to take Iron po daily .   She had EGD  which showed mild antral erythema, and Colonoscopy was rescheduled  for March         Review of Systems   Constitutional:  Negative for activity change, fatigue and fever.   HENT:  Negative for congestion.    Eyes:  Negative for redness and visual disturbance.   Respiratory:  Negative for cough, shortness of breath and wheezing.    Cardiovascular:  Negative for palpitations and leg swelling.   Gastrointestinal:  Negative for abdominal pain, blood in stool and constipation.   Endocrine: Negative.    Genitourinary:  Negative for dysuria, frequency and urgency.   Musculoskeletal:  Positive for arthralgias and back pain. Negative for joint swelling.   Skin:  Negative for rash.   Allergic/Immunologic: Negative.    Neurological:  Negative for dizziness, weakness and headaches.   Hematological:  Negative for adenopathy.   Psychiatric/Behavioral:  Negative for agitation and behavioral problems.        Objective   There were no vitals taken for this visit.    Physical Exam  Constitutional:       Appearance: Normal appearance.   HENT:      Head: Normocephalic and atraumatic.      Right  Ear: Tympanic membrane and external ear normal.      Left Ear: Tympanic membrane and external ear normal.      Nose: No congestion.      Mouth/Throat:      Mouth: Mucous membranes are moist.      Pharynx: Oropharynx is clear.   Eyes:      Extraocular Movements: Extraocular movements intact.      Conjunctiva/sclera: Conjunctivae normal.      Pupils: Pupils are equal, round, and reactive to light.   Cardiovascular:      Rate and Rhythm: Normal rate and regular rhythm.      Pulses: Normal pulses.      Heart sounds: Normal heart sounds. No murmur heard.  Pulmonary:      Effort: Pulmonary effort is normal.      Breath sounds: Normal breath sounds. No wheezing or rales.   Abdominal:      General: Abdomen is flat. Bowel sounds are normal.      Palpations: Abdomen is soft.      Hernia: No hernia is present.   Musculoskeletal:         General: No swelling or tenderness. Normal range of motion.      Cervical back: Normal range of motion and neck supple.      Right lower leg: No edema.      Left lower leg: No edema.   Skin:     General: Skin is warm and dry.      Capillary Refill: Capillary refill takes less than 2 seconds.      Findings: No rash.   Neurological:      General: No focal deficit present.      Mental Status: She is alert and oriented to person, place, and time.   Psychiatric:         Mood and Affect: Mood normal.         Behavior: Behavior normal.         Assessment/Plan        Lumbar Degenerative Disk Disease:  pain control , tramadol prn , refilled , # 60   OARRS reviewed  otc analgesics , Tizanidine prn  s/p  Lumbar spine surgery on 4/19/22      Fibromyalgia:  otc analgesics  increase activity,exercises  rec. PT and water therapy , she goes to [facility]    Knee joint OA:  S/p bilateral TKA  Orthopedic followup  with   Dr Simeon at [facility]   pain control , regular activity    no current issues , pain or mobility         Polyneuropathy:  Reduce gabapentin to 600mg po tid    COPD:   Proair inhaler  PFT's   advised  CT Chest reviewed   Pulmonology consult, Dr Martir Valencia at [facility]  stop smoking     Nicotine dependence::  smoking cessation , counselled again  FH : Mom  of lung CA     Iron Deficiency Anemia:    Iron po   EGD and Colonoscopy per Dr Leary as above

## 2024-03-26 ENCOUNTER — APPOINTMENT (OUTPATIENT)
Dept: PRIMARY CARE | Facility: CLINIC | Age: 64
End: 2024-03-26
Payer: MEDICAID

## 2024-03-26 ENCOUNTER — TELEPHONE (OUTPATIENT)
Dept: PRIMARY CARE | Facility: CLINIC | Age: 64
End: 2024-03-26

## 2024-03-26 DIAGNOSIS — G47.00 INSOMNIA, UNSPECIFIED TYPE: Primary | ICD-10-CM

## 2024-03-26 RX ORDER — TEMAZEPAM 15 MG/1
15 CAPSULE ORAL NIGHTLY
Qty: 60 CAPSULE | Refills: 1 | Status: CANCELLED | OUTPATIENT
Start: 2024-03-26

## 2024-03-26 RX ORDER — TEMAZEPAM 15 MG/1
15 CAPSULE ORAL NIGHTLY
Qty: 30 CAPSULE | Refills: 1 | Status: SHIPPED | OUTPATIENT
Start: 2024-03-26 | End: 2024-04-15 | Stop reason: SDUPTHER

## 2024-03-26 NOTE — TELEPHONE ENCOUNTER
A New Presription is required in order for patient to continue therapy TEMAZEPAM 15 MG 1 capsule by mouth at bedtime PRN

## 2024-04-15 ENCOUNTER — OFFICE VISIT (OUTPATIENT)
Dept: PRIMARY CARE | Facility: CLINIC | Age: 64
End: 2024-04-15
Payer: MEDICAID

## 2024-04-15 VITALS
SYSTOLIC BLOOD PRESSURE: 122 MMHG | BODY MASS INDEX: 15.38 KG/M2 | WEIGHT: 92.34 LBS | HEIGHT: 65 IN | HEART RATE: 111 BPM | TEMPERATURE: 96.7 F | DIASTOLIC BLOOD PRESSURE: 78 MMHG | OXYGEN SATURATION: 95 %

## 2024-04-15 DIAGNOSIS — M15.9 POLYARTHROSIS: ICD-10-CM

## 2024-04-15 DIAGNOSIS — F11.90 CHRONIC NARCOTIC USE: Primary | ICD-10-CM

## 2024-04-15 DIAGNOSIS — G47.00 INSOMNIA, UNSPECIFIED TYPE: ICD-10-CM

## 2024-04-15 DIAGNOSIS — J43.9 PULMONARY EMPHYSEMA, UNSPECIFIED EMPHYSEMA TYPE (MULTI): ICD-10-CM

## 2024-04-15 DIAGNOSIS — M51.36 DEGENERATION, INTERVERTEBRAL DISC, LUMBAR: ICD-10-CM

## 2024-04-15 PROCEDURE — 99214 OFFICE O/P EST MOD 30 MIN: CPT | Performed by: INTERNAL MEDICINE

## 2024-04-15 PROCEDURE — 1159F MED LIST DOCD IN RCRD: CPT | Performed by: INTERNAL MEDICINE

## 2024-04-15 RX ORDER — TIZANIDINE 4 MG/1
4 TABLET ORAL DAILY
Qty: 30 TABLET | Refills: 0 | Status: SHIPPED | OUTPATIENT
Start: 2024-04-15 | End: 2024-05-15 | Stop reason: SDUPTHER

## 2024-04-15 RX ORDER — NALOXONE HYDROCHLORIDE 4 MG/.1ML
4 SPRAY NASAL AS NEEDED
Qty: 2 EACH | Refills: 0 | Status: SHIPPED | OUTPATIENT
Start: 2024-04-15

## 2024-04-15 RX ORDER — TRAMADOL HYDROCHLORIDE 50 MG/1
50 TABLET ORAL 2 TIMES DAILY PRN
Qty: 60 TABLET | Refills: 0 | Status: SHIPPED | OUTPATIENT
Start: 2024-04-15 | End: 2024-05-15 | Stop reason: SDUPTHER

## 2024-04-15 RX ORDER — TEMAZEPAM 15 MG/1
15 CAPSULE ORAL NIGHTLY
Qty: 30 CAPSULE | Refills: 1 | Status: SHIPPED | OUTPATIENT
Start: 2024-04-15 | End: 2024-05-15 | Stop reason: SDUPTHER

## 2024-04-15 ASSESSMENT — ENCOUNTER SYMPTOMS
CONSTIPATION: 0
ACTIVITY CHANGE: 0
ALLERGIC/IMMUNOLOGIC NEGATIVE: 1
BACK PAIN: 1
ADENOPATHY: 0
BLOOD IN STOOL: 0
DYSURIA: 0
FREQUENCY: 0
WEAKNESS: 0
PALPITATIONS: 0
SORE THROAT: 1
DIZZINESS: 0
EYE REDNESS: 0
FATIGUE: 0
FEVER: 0
ENDOCRINE NEGATIVE: 1
ARTHRALGIAS: 1
AGITATION: 0
JOINT SWELLING: 0

## 2024-04-15 NOTE — PROGRESS NOTES
"Subjective   Patient ID: Mariann Mock is a 64 y.o. female who presents for Follow-up and Sore Throat.    She is doing well, feels fine.She denies fever , nausea , sputum , dyspnea , dizziness , palpitations.She is taking care of many grandchildren. She has familial stress .She does not want epidural inj. in her back No dysuria , bleeding. She has some constipation , otc meds help.  She has diffuse musculoskeletal pains. She  had previous knee surgery by Dr Simeon.She takes gabapentin daily for numbness and paresthesia. She has mild urinary incontinence.  She takes tramadol prn for  moderate pain  She had CT chest , which shows evidence of emphysema, she has long h/o smoking.  She is actively trying to quit smoking, down to 2 cig / week  and should  see Pulmonary at [facility] .   She has anemia, and advised to take Iron po daily .   She had EGD  which showed mild antral erythema, and Colonoscopy was rescheduled / pending          Review of Systems   Constitutional:  Negative for activity change, fatigue and fever.   Eyes:  Negative for redness and visual disturbance.   Cardiovascular:  Negative for palpitations and leg swelling.   Gastrointestinal:  Negative for blood in stool and constipation.   Endocrine: Negative.    Genitourinary:  Negative for dysuria, frequency and urgency.   Musculoskeletal:  Positive for arthralgias and back pain. Negative for joint swelling.   Skin:  Negative for rash.   Allergic/Immunologic: Negative.    Neurological:  Negative for dizziness and weakness.   Hematological:  Negative for adenopathy.   Psychiatric/Behavioral:  Negative for agitation and behavioral problems.        Objective   /78 (BP Location: Left arm, Patient Position: Sitting, BP Cuff Size: Adult)   Pulse (!) 111   Temp 35.9 °C (96.7 °F) (Temporal)   Ht 1.651 m (5' 5\")   Wt (!) 41.9 kg (92 lb 5.4 oz)   SpO2 95%   BMI 15.37 kg/m²     Physical Exam  Constitutional:       Appearance: Normal appearance.   HENT:      " Head: Normocephalic and atraumatic.      Right Ear: Tympanic membrane and external ear normal.      Left Ear: Tympanic membrane and external ear normal.      Nose: No congestion.      Mouth/Throat:      Mouth: Mucous membranes are moist.      Pharynx: Oropharynx is clear.   Eyes:      Extraocular Movements: Extraocular movements intact.      Conjunctiva/sclera: Conjunctivae normal.      Pupils: Pupils are equal, round, and reactive to light.   Cardiovascular:      Rate and Rhythm: Normal rate and regular rhythm.      Pulses: Normal pulses.      Heart sounds: Normal heart sounds. No murmur heard.  Pulmonary:      Effort: Pulmonary effort is normal.      Breath sounds: Normal breath sounds. No wheezing or rales.   Abdominal:      General: Abdomen is flat. Bowel sounds are normal.      Palpations: Abdomen is soft.      Hernia: No hernia is present.   Musculoskeletal:         General: No swelling or tenderness. Normal range of motion.      Cervical back: Normal range of motion and neck supple.      Right lower leg: No edema.      Left lower leg: No edema.   Skin:     General: Skin is warm and dry.      Capillary Refill: Capillary refill takes less than 2 seconds.      Findings: No rash.   Neurological:      General: No focal deficit present.      Mental Status: She is alert and oriented to person, place, and time.   Psychiatric:         Mood and Affect: Mood normal.         Behavior: Behavior normal.         Assessment/Plan        Lumbar Degenerative Disk Disease:  pain control , tramadol prn , refilled , # 60   OARRS reviewed    otc analgReviewed Controlled Substance Agreement including but not limited to the benefits, risk, and alternatives to treatment with a Controlled Substance medication(s)              : I have personally reviewed the [facility]  report on this patient.   I have considered the risks of abuse dependence addiction and diversion.             I believe it is clinically appropriate for this patient to be  prescribed the medicationsesics , Tizanidine prn  s/p  Lumbar spine surgery on 22      Fibromyalgia:  otc analgesics  increase activity,exercises  rec. PT and water therapy , she goes to [facility]    Knee joint OA:  S/p bilateral TKA  Orthopedic followup  with   Dr Simeon at [facility]   pain control , regular activity    no current issues , pain or mobility         Polyneuropathy:  Reduce gabapentin to 600mg po tid    COPD:   Proair inhaler  PFT's  advised  CT Chest reviewed   Pulmonology consult, Dr Martir Valencia at [facility]  stop smoking     Nicotine dependence::  smoking cessation , counselled again  FH : Mom  of lung CA     Iron Deficiency Anemia:    Iron po   EGD and Colonoscopy per Dr Leary     Insomnia:  Temazepam at  prn  OARRS reviewed    Reviewed Controlled Substance Agreement including but not limited to the benefits, risk, and alternatives to treatment with a Controlled Substance medication(s)              : I have personally reviewed the [facility]  report on this patient.   I have considered the risks of abuse dependence addiction and diversion.             I believe it is clinically appropriate for this patient to be prescribed the medications

## 2024-05-15 ENCOUNTER — OFFICE VISIT (OUTPATIENT)
Dept: PRIMARY CARE | Facility: CLINIC | Age: 64
End: 2024-05-15
Payer: MEDICAID

## 2024-05-15 VITALS
BODY MASS INDEX: 15.13 KG/M2 | TEMPERATURE: 97.3 F | DIASTOLIC BLOOD PRESSURE: 70 MMHG | HEART RATE: 99 BPM | OXYGEN SATURATION: 96 % | WEIGHT: 90.8 LBS | SYSTOLIC BLOOD PRESSURE: 118 MMHG | HEIGHT: 65 IN

## 2024-05-15 DIAGNOSIS — G47.00 INSOMNIA, UNSPECIFIED TYPE: ICD-10-CM

## 2024-05-15 DIAGNOSIS — F11.90 CHRONIC NARCOTIC USE: ICD-10-CM

## 2024-05-15 DIAGNOSIS — J43.9 PULMONARY EMPHYSEMA, UNSPECIFIED EMPHYSEMA TYPE (MULTI): ICD-10-CM

## 2024-05-15 DIAGNOSIS — F17.209 NICOTINE DEPENDENCE WITH NICOTINE-INDUCED DISORDER, UNSPECIFIED NICOTINE PRODUCT TYPE: ICD-10-CM

## 2024-05-15 DIAGNOSIS — G62.9 NEUROPATHY: ICD-10-CM

## 2024-05-15 DIAGNOSIS — M51.36 DEGENERATION, INTERVERTEBRAL DISC, LUMBAR: ICD-10-CM

## 2024-05-15 DIAGNOSIS — M15.9 POLYARTHROSIS: ICD-10-CM

## 2024-05-15 DIAGNOSIS — Z00.00 WELLNESS EXAMINATION: Primary | ICD-10-CM

## 2024-05-15 PROCEDURE — 1159F MED LIST DOCD IN RCRD: CPT | Performed by: INTERNAL MEDICINE

## 2024-05-15 PROCEDURE — 99214 OFFICE O/P EST MOD 30 MIN: CPT | Performed by: INTERNAL MEDICINE

## 2024-05-15 RX ORDER — GABAPENTIN 600 MG/1
600 TABLET ORAL 3 TIMES DAILY
Qty: 90 TABLET | Refills: 2 | Status: SHIPPED | OUTPATIENT
Start: 2024-05-15

## 2024-05-15 RX ORDER — TEMAZEPAM 15 MG/1
15 CAPSULE ORAL NIGHTLY
Qty: 30 CAPSULE | Refills: 1 | Status: SHIPPED | OUTPATIENT
Start: 2024-05-15

## 2024-05-15 RX ORDER — TIZANIDINE 4 MG/1
4 TABLET ORAL DAILY
Qty: 30 TABLET | Refills: 0 | Status: SHIPPED | OUTPATIENT
Start: 2024-05-15

## 2024-05-15 RX ORDER — TRAMADOL HYDROCHLORIDE 50 MG/1
50 TABLET ORAL 2 TIMES DAILY PRN
Qty: 60 TABLET | Refills: 0 | Status: SHIPPED | OUTPATIENT
Start: 2024-05-15 | End: 2024-06-14

## 2024-05-15 RX ORDER — VARENICLINE TARTRATE 1 MG/1
1 TABLET, FILM COATED ORAL 2 TIMES DAILY
Qty: 60 TABLET | Refills: 3 | Status: SHIPPED | OUTPATIENT
Start: 2024-05-15

## 2024-05-15 ASSESSMENT — ENCOUNTER SYMPTOMS
EYE REDNESS: 0
FEVER: 0
BLOOD IN STOOL: 0
PALPITATIONS: 0
DYSURIA: 0
JOINT SWELLING: 0
ACTIVITY CHANGE: 0
BACK PAIN: 1
SORE THROAT: 0
DIZZINESS: 0
ALLERGIC/IMMUNOLOGIC NEGATIVE: 1
ARTHRALGIAS: 1
CONSTIPATION: 0
ADENOPATHY: 0
FATIGUE: 0
FREQUENCY: 0
AGITATION: 0
WEAKNESS: 0
ENDOCRINE NEGATIVE: 1

## 2024-05-15 NOTE — PROGRESS NOTES
"Subjective   Patient ID: Mariann Mock is a 64 y.o. female who presents for Follow-up (1 month).    She is doing well, feels fine.She denies fever , nausea , sputum , dyspnea , dizziness , palpitations.She is taking care of many grandchildren. She has familial stress .She does not want epidural inj. in her back No dysuria , bleeding. She has some constipation , otc meds help.  She has diffuse musculoskeletal pains. She  had previous knee surgery by Dr Simeon.She takes gabapentin daily for numbness and paresthesia. She has mild urinary incontinence.  She takes tramadol prn for  moderate pain  She had CT chest , which shows evidence of emphysema, she has long h/o smoking.  She is actively trying to quit smoking, down to 2 cig / week  and should  see Pulmonary at [facility] .   She has anemia, and advised to take Iron po daily .   She had EGD  which showed mild antral erythema, and Colonoscopy was rescheduled / pending          Review of Systems   Constitutional:  Negative for activity change, fatigue and fever.   HENT:  Negative for sore throat.    Eyes:  Negative for redness and visual disturbance.   Cardiovascular:  Negative for palpitations and leg swelling.   Gastrointestinal:  Negative for blood in stool and constipation.   Endocrine: Negative.    Genitourinary:  Negative for dysuria, frequency and urgency.   Musculoskeletal:  Positive for arthralgias and back pain. Negative for joint swelling.   Skin:  Negative for rash.   Allergic/Immunologic: Negative.    Neurological:  Negative for dizziness and weakness.   Hematological:  Negative for adenopathy.   Psychiatric/Behavioral:  Negative for agitation and behavioral problems.        Objective   /70 (BP Location: Right arm, Patient Position: Sitting, BP Cuff Size: Adult)   Pulse 99   Temp 36.3 °C (97.3 °F) (Temporal)   Ht 1.651 m (5' 5\")   Wt (!) 41.2 kg (90 lb 12.8 oz)   SpO2 96%   BMI 15.11 kg/m²     Physical Exam  Constitutional:       Appearance: Normal " appearance.   HENT:      Head: Normocephalic and atraumatic.      Right Ear: Tympanic membrane and external ear normal.      Left Ear: Tympanic membrane and external ear normal.      Nose: No congestion.      Mouth/Throat:      Mouth: Mucous membranes are moist.      Pharynx: Oropharynx is clear.   Eyes:      Extraocular Movements: Extraocular movements intact.      Conjunctiva/sclera: Conjunctivae normal.      Pupils: Pupils are equal, round, and reactive to light.   Cardiovascular:      Rate and Rhythm: Normal rate and regular rhythm.      Pulses: Normal pulses.      Heart sounds: Normal heart sounds. No murmur heard.  Pulmonary:      Effort: Pulmonary effort is normal.      Breath sounds: Normal breath sounds. No wheezing or rales.   Abdominal:      General: Abdomen is flat. Bowel sounds are normal.      Palpations: Abdomen is soft.      Hernia: No hernia is present.   Musculoskeletal:         General: No swelling or tenderness. Normal range of motion.      Cervical back: Normal range of motion and neck supple.      Right lower leg: No edema.      Left lower leg: No edema.   Skin:     General: Skin is warm and dry.      Capillary Refill: Capillary refill takes less than 2 seconds.      Findings: No rash.   Neurological:      General: No focal deficit present.      Mental Status: She is alert and oriented to person, place, and time.   Psychiatric:         Mood and Affect: Mood normal.         Behavior: Behavior normal.         Assessment/Plan        Lumbar Degenerative Disk Disease:  pain control , tramadol prn , refilled , # 60   OARRS reviewed    otc analgReviewed Controlled Substance Agreement including but not limited to the benefits, risk, and alternatives to treatment with a Controlled Substance medication(s)              : I have personally reviewed the [facility]rs  report on this patient.   I have considered the risks of abuse dependence addiction and diversion.             I believe it is clinically appropriate  for this patient to be prescribed the medicationsesics , Tizanidine prn  s/p  Lumbar spine surgery on 22      Fibromyalgia:  otc analgesics  increase activity,exercises  rec. PT and water therapy , she goes to [facility]    Knee joint OA:  S/p bilateral TKA  Orthopedic followup  with   Dr Simeon at [facility]   pain control , regular activity    no current issues , pain or mobility         Polyneuropathy:  Reduce gabapentin to 600mg po tid    COPD:   Proair inhaler  PFT's  advised  CT Chest reviewed   Pulmonology consult, Dr Martir Valencia at [facility]  stop smoking     Nicotine dependence::  smoking cessation , counselled again  FH : Mom  of lung CA     Iron Deficiency Anemia:    Iron po   EGD and Colonoscopy per Dr Leary     Insomnia:  Temazepam at  prn  OARRS reviewed    Reviewed Controlled Substance Agreement including but not limited to the benefits, risk, and alternatives to treatment with a Controlled Substance medication(s)              : I have personally reviewed the O[facility]rs  report on this patient.   I have considered the risks of abuse dependence addiction and diversion.             I believe it is clinically appropriate for this patient to be prescribed the medications    Check labs , urine drug screen

## 2024-06-13 ENCOUNTER — APPOINTMENT (OUTPATIENT)
Dept: PRIMARY CARE | Facility: CLINIC | Age: 64
End: 2024-06-13
Payer: MEDICAID

## 2024-06-28 ENCOUNTER — LAB (OUTPATIENT)
Dept: LAB | Facility: LAB | Age: 64
End: 2024-06-28
Payer: MEDICARE

## 2024-06-28 ENCOUNTER — OFFICE VISIT (OUTPATIENT)
Dept: PRIMARY CARE | Facility: CLINIC | Age: 64
End: 2024-06-28
Payer: MEDICARE

## 2024-06-28 VITALS
TEMPERATURE: 97 F | OXYGEN SATURATION: 96 % | SYSTOLIC BLOOD PRESSURE: 116 MMHG | WEIGHT: 89 LBS | BODY MASS INDEX: 14.83 KG/M2 | RESPIRATION RATE: 18 BRPM | HEIGHT: 65 IN | DIASTOLIC BLOOD PRESSURE: 60 MMHG | HEART RATE: 101 BPM

## 2024-06-28 DIAGNOSIS — M51.36 DEGENERATION, INTERVERTEBRAL DISC, LUMBAR: ICD-10-CM

## 2024-06-28 DIAGNOSIS — G47.00 INSOMNIA, UNSPECIFIED TYPE: ICD-10-CM

## 2024-06-28 DIAGNOSIS — Z00.00 WELLNESS EXAMINATION: ICD-10-CM

## 2024-06-28 DIAGNOSIS — J43.9 PULMONARY EMPHYSEMA, UNSPECIFIED EMPHYSEMA TYPE (MULTI): ICD-10-CM

## 2024-06-28 DIAGNOSIS — M15.9 POLYARTHROSIS: ICD-10-CM

## 2024-06-28 DIAGNOSIS — F11.90 CHRONIC NARCOTIC USE: ICD-10-CM

## 2024-06-28 DIAGNOSIS — G62.9 NEUROPATHY: ICD-10-CM

## 2024-06-28 LAB
ALBUMIN SERPL BCP-MCNC: 4 G/DL (ref 3.4–5)
ALP SERPL-CCNC: 115 U/L (ref 33–136)
ALT SERPL W P-5'-P-CCNC: 55 U/L (ref 7–45)
ANION GAP SERPL CALC-SCNC: 12 MMOL/L (ref 10–20)
AST SERPL W P-5'-P-CCNC: 60 U/L (ref 9–39)
BASOPHILS # BLD AUTO: 0.02 X10*3/UL (ref 0–0.1)
BASOPHILS NFR BLD AUTO: 0.2 %
BILIRUB SERPL-MCNC: 0.3 MG/DL (ref 0–1.2)
BUN SERPL-MCNC: 11 MG/DL (ref 6–23)
CALCIUM SERPL-MCNC: 9.1 MG/DL (ref 8.6–10.3)
CHLORIDE SERPL-SCNC: 108 MMOL/L (ref 98–107)
CHOLEST SERPL-MCNC: 154 MG/DL (ref 0–199)
CHOLESTEROL/HDL RATIO: 2.2
CO2 SERPL-SCNC: 24 MMOL/L (ref 21–32)
CREAT SERPL-MCNC: 0.56 MG/DL (ref 0.5–1.05)
EGFRCR SERPLBLD CKD-EPI 2021: >90 ML/MIN/1.73M*2
EOSINOPHIL # BLD AUTO: 0.41 X10*3/UL (ref 0–0.7)
EOSINOPHIL NFR BLD AUTO: 5.1 %
ERYTHROCYTE [DISTWIDTH] IN BLOOD BY AUTOMATED COUNT: 16.3 % (ref 11.5–14.5)
GLUCOSE SERPL-MCNC: 116 MG/DL (ref 74–99)
HCT VFR BLD AUTO: 38.9 % (ref 36–46)
HDLC SERPL-MCNC: 68.7 MG/DL
HGB BLD-MCNC: 11.9 G/DL (ref 12–16)
IMM GRANULOCYTES # BLD AUTO: 0.01 X10*3/UL (ref 0–0.7)
IMM GRANULOCYTES NFR BLD AUTO: 0.1 % (ref 0–0.9)
LDLC SERPL CALC-MCNC: 63 MG/DL
LYMPHOCYTES # BLD AUTO: 1.45 X10*3/UL (ref 1.2–4.8)
LYMPHOCYTES NFR BLD AUTO: 18 %
MCH RBC QN AUTO: 28.7 PG (ref 26–34)
MCHC RBC AUTO-ENTMCNC: 30.6 G/DL (ref 32–36)
MCV RBC AUTO: 94 FL (ref 80–100)
MONOCYTES # BLD AUTO: 0.56 X10*3/UL (ref 0.1–1)
MONOCYTES NFR BLD AUTO: 7 %
NEUTROPHILS # BLD AUTO: 5.59 X10*3/UL (ref 1.2–7.7)
NEUTROPHILS NFR BLD AUTO: 69.6 %
NON HDL CHOLESTEROL: 85 MG/DL (ref 0–149)
NRBC BLD-RTO: 0 /100 WBCS (ref 0–0)
PLATELET # BLD AUTO: 257 X10*3/UL (ref 150–450)
POTASSIUM SERPL-SCNC: 3.4 MMOL/L (ref 3.5–5.3)
PROT SERPL-MCNC: 6.5 G/DL (ref 6.4–8.2)
RBC # BLD AUTO: 4.15 X10*6/UL (ref 4–5.2)
SODIUM SERPL-SCNC: 141 MMOL/L (ref 136–145)
TRIGL SERPL-MCNC: 113 MG/DL (ref 0–149)
VLDL: 23 MG/DL (ref 0–40)
WBC # BLD AUTO: 8 X10*3/UL (ref 4.4–11.3)

## 2024-06-28 PROCEDURE — 80053 COMPREHEN METABOLIC PANEL: CPT

## 2024-06-28 PROCEDURE — 36415 COLL VENOUS BLD VENIPUNCTURE: CPT

## 2024-06-28 PROCEDURE — 80061 LIPID PANEL: CPT

## 2024-06-28 PROCEDURE — 85025 COMPLETE CBC W/AUTO DIFF WBC: CPT

## 2024-06-28 PROCEDURE — 1159F MED LIST DOCD IN RCRD: CPT | Performed by: INTERNAL MEDICINE

## 2024-06-28 PROCEDURE — 99214 OFFICE O/P EST MOD 30 MIN: CPT | Performed by: INTERNAL MEDICINE

## 2024-06-28 RX ORDER — TIZANIDINE 4 MG/1
4 TABLET ORAL DAILY PRN
Qty: 30 TABLET | Refills: 1 | Status: SHIPPED | OUTPATIENT
Start: 2024-06-28

## 2024-06-28 RX ORDER — TEMAZEPAM 15 MG/1
15 CAPSULE ORAL NIGHTLY PRN
Qty: 90 CAPSULE | Refills: 0 | Status: SHIPPED | OUTPATIENT
Start: 2024-06-28

## 2024-06-28 RX ORDER — TRAMADOL HYDROCHLORIDE 50 MG/1
50 TABLET ORAL 2 TIMES DAILY PRN
Qty: 60 TABLET | Refills: 0 | Status: SHIPPED | OUTPATIENT
Start: 2024-06-28 | End: 2024-07-28

## 2024-06-28 ASSESSMENT — ENCOUNTER SYMPTOMS
FEVER: 0
EYE REDNESS: 0
AGITATION: 0
DIZZINESS: 0
BLOOD IN STOOL: 0
ARTHRALGIAS: 1
ADENOPATHY: 0
DYSURIA: 0
PALPITATIONS: 0
CONSTIPATION: 0
ALLERGIC/IMMUNOLOGIC NEGATIVE: 1
FREQUENCY: 0
FATIGUE: 0
JOINT SWELLING: 0
WEAKNESS: 0
ACTIVITY CHANGE: 0
SORE THROAT: 0
BACK PAIN: 1
ENDOCRINE NEGATIVE: 1

## 2024-06-28 NOTE — PROGRESS NOTES
"Subjective   Patient ID: Mariann Mock is a 64 y.o. female who presents for Follow-up (1 month).    She is doing well, feels fine.She denies fever , nausea , sputum , dyspnea , dizziness , palpitations.She is taking care of many grandchildren. She has familial stress .She does not want epidural inj. in her back No dysuria , bleeding. She has some constipation , otc meds help.  She has diffuse musculoskeletal pains. She  had previous knee surgery by Dr Simeon.She takes gabapentin daily for numbness and paresthesia. She has mild urinary incontinence.  She takes tramadol prn for  moderate pain  She had CT chest , which shows evidence of emphysema, she has long h/o smoking.  She is actively trying to quit smoking, down to 2 cig / week  and should  see Pulmonary at [facility] .   She has anemia, and advised to take Iron po daily .   She had EGD  which showed mild antral erythema, and Colonoscopy was rescheduled / pending ( has new Insurance now )         Review of Systems   Constitutional:  Negative for activity change, fatigue and fever.   HENT:  Negative for sore throat.    Eyes:  Negative for redness and visual disturbance.   Cardiovascular:  Negative for palpitations and leg swelling.   Gastrointestinal:  Negative for blood in stool and constipation.   Endocrine: Negative.    Genitourinary:  Negative for dysuria, frequency and urgency.   Musculoskeletal:  Positive for arthralgias and back pain. Negative for joint swelling.   Skin:  Negative for rash.   Allergic/Immunologic: Negative.    Neurological:  Negative for dizziness and weakness.   Hematological:  Negative for adenopathy.   Psychiatric/Behavioral:  Negative for agitation and behavioral problems.        Objective   /60 (BP Location: Left arm, Patient Position: Sitting, BP Cuff Size: Adult)   Pulse 101   Temp 36.1 °C (97 °F) (Temporal)   Resp 18   Ht 1.651 m (5' 5\")   Wt (!) 40.4 kg (89 lb)   SpO2 96%   BMI 14.81 kg/m²     Physical Exam  Constitutional:  "      Appearance: Normal appearance.   HENT:      Head: Normocephalic and atraumatic.      Right Ear: Tympanic membrane and external ear normal.      Left Ear: Tympanic membrane and external ear normal.      Nose: No congestion.      Mouth/Throat:      Mouth: Mucous membranes are moist.      Pharynx: Oropharynx is clear.   Eyes:      Extraocular Movements: Extraocular movements intact.      Conjunctiva/sclera: Conjunctivae normal.      Pupils: Pupils are equal, round, and reactive to light.   Cardiovascular:      Rate and Rhythm: Normal rate and regular rhythm.      Pulses: Normal pulses.      Heart sounds: Normal heart sounds. No murmur heard.  Pulmonary:      Effort: Pulmonary effort is normal.      Breath sounds: Normal breath sounds. No wheezing or rales.   Abdominal:      General: Abdomen is flat. Bowel sounds are normal.      Palpations: Abdomen is soft.      Hernia: No hernia is present.   Musculoskeletal:         General: No swelling or tenderness. Normal range of motion.      Cervical back: Normal range of motion and neck supple.      Right lower leg: No edema.      Left lower leg: No edema.   Skin:     General: Skin is warm and dry.      Capillary Refill: Capillary refill takes less than 2 seconds.      Findings: No rash.   Neurological:      General: No focal deficit present.      Mental Status: She is alert and oriented to person, place, and time.   Psychiatric:         Mood and Affect: Mood normal.         Behavior: Behavior normal.         Assessment/Plan        Lumbar Degenerative Disk Disease:  pain control , tramadol prn , refilled , # 60   OARRS reviewed    otc analgReviewed Controlled Substance Agreement including but not limited to the benefits, risk, and alternatives to treatment with a Controlled Substance medication(s)              : I have personally reviewed the [facility]rs  report on this patient.   I have considered the risks of abuse dependence addiction and diversion.             I believe it  is clinically appropriate for this patient to be prescribed the medicationsesics , Tizanidine prn  s/p  Lumbar spine surgery on 22      Fibromyalgia:  otc analgesics  increase activity,exercises  rec. PT and water therapy , she goes to [facility]    Knee joint OA:  S/p bilateral TKA  Orthopedic followup  with   Dr Simeon at [facility]   pain control , regular activity    no current issues , pain or mobility         Polyneuropathy:  Reduce gabapentin to 600mg po tid    COPD:   Proair inhaler  PFT's  advised  CT Chest reviewed   Pulmonology consult, [facility] Martir [facility] at [facility]  stop smoking     Nicotine dependence::  smoking cessation , counselled again  FH : Mom  of lung CA     Iron Deficiency Anemia:    Iron po   EGD done , 23   and Colonoscopy per Dr Leary     Insomnia:  Temazepam at  prn  OARRS reviewed    Reviewed Controlled Substance Agreement including but not limited to the benefits, risk, and alternatives to treatment with a Controlled Substance medication(s)              : I have personally reviewed the Oarrs  report on this patient.   I have considered the risks of abuse dependence addiction and diversion.             I believe it is clinically appropriate for this patient to be prescribed the medications           Vision check   Check labs , ordered on last visit

## 2024-07-19 ENCOUNTER — OFFICE VISIT (OUTPATIENT)
Dept: PRIMARY CARE | Facility: CLINIC | Age: 64
End: 2024-07-19
Payer: MEDICARE

## 2024-07-19 VITALS
OXYGEN SATURATION: 97 % | HEIGHT: 65 IN | BODY MASS INDEX: 14.76 KG/M2 | SYSTOLIC BLOOD PRESSURE: 118 MMHG | WEIGHT: 88.6 LBS | HEART RATE: 91 BPM | DIASTOLIC BLOOD PRESSURE: 62 MMHG

## 2024-07-19 DIAGNOSIS — J43.9 PULMONARY EMPHYSEMA, UNSPECIFIED EMPHYSEMA TYPE (MULTI): ICD-10-CM

## 2024-07-19 DIAGNOSIS — M51.36 DEGENERATION, INTERVERTEBRAL DISC, LUMBAR: ICD-10-CM

## 2024-07-19 DIAGNOSIS — M15.9 POLYARTHROSIS: ICD-10-CM

## 2024-07-19 PROCEDURE — 1159F MED LIST DOCD IN RCRD: CPT | Performed by: INTERNAL MEDICINE

## 2024-07-19 PROCEDURE — 99214 OFFICE O/P EST MOD 30 MIN: CPT | Performed by: INTERNAL MEDICINE

## 2024-07-19 PROCEDURE — 3008F BODY MASS INDEX DOCD: CPT | Performed by: INTERNAL MEDICINE

## 2024-07-19 RX ORDER — TRAMADOL HYDROCHLORIDE 50 MG/1
50 TABLET ORAL 2 TIMES DAILY PRN
Qty: 60 TABLET | Refills: 0 | Status: SHIPPED | OUTPATIENT
Start: 2024-07-26 | End: 2024-08-25

## 2024-07-19 ASSESSMENT — ENCOUNTER SYMPTOMS
FREQUENCY: 0
PALPITATIONS: 0
BACK PAIN: 1
CONSTIPATION: 0
ADENOPATHY: 0
EYE REDNESS: 0
ENDOCRINE NEGATIVE: 1
BLOOD IN STOOL: 0
ACTIVITY CHANGE: 0
ALLERGIC/IMMUNOLOGIC NEGATIVE: 1
DIZZINESS: 0
DYSURIA: 0
AGITATION: 0

## 2024-07-19 NOTE — PROGRESS NOTES
"Subjective   Patient ID: Mariann Mock is a 64 y.o. female who presents for Follow-up and Med Refill.    She is doing well, feels fine.She denies fever , nausea , sputum , dyspnea , dizziness , palpitations.She is taking care of many grandchildren. She has familial stress .She does not want epidural inj. in her back No dysuria , bleeding. She has some constipation , otc meds help.  She has diffuse musculoskeletal pains. She  had previous knee surgery by Dr Simeon.She takes gabapentin daily for numbness and paresthesia. She has mild urinary incontinence.  She takes tramadol prn for  moderate pain  She had CT chest , which shows evidence of emphysema, she has long h/o smoking.  She is actively trying to quit smoking, down to 2 cig / week  and should  see Pulmonary at [facility] .   She has anemia, and advised to take Iron po daily .   She had EGD  which showed mild antral erythema, and Colonoscopy was rescheduled / pending ( has new Insurance now )         Review of Systems   Constitutional:  Negative for activity change.   Eyes:  Negative for redness and visual disturbance.   Cardiovascular:  Negative for palpitations and leg swelling.   Gastrointestinal:  Negative for blood in stool and constipation.   Endocrine: Negative.    Genitourinary:  Negative for dysuria, frequency and urgency.   Musculoskeletal:  Positive for back pain.   Allergic/Immunologic: Negative.    Neurological:  Negative for dizziness.   Hematological:  Negative for adenopathy.   Psychiatric/Behavioral:  Negative for agitation and behavioral problems.        Objective   /62 (BP Location: Right arm, Patient Position: Sitting, BP Cuff Size: Adult)   Pulse 91   Ht 1.651 m (5' 5\")   Wt (!) 40.2 kg (88 lb 9.6 oz)   SpO2 97%   BMI 14.74 kg/m²     Physical Exam  Constitutional:       Appearance: Normal appearance.   HENT:      Head: Normocephalic and atraumatic.      Right Ear: Tympanic membrane and external ear normal.      Left Ear: Tympanic " membrane and external ear normal.      Nose: No congestion.      Mouth/Throat:      Mouth: Mucous membranes are moist.      Pharynx: Oropharynx is clear.   Eyes:      Extraocular Movements: Extraocular movements intact.      Conjunctiva/sclera: Conjunctivae normal.      Pupils: Pupils are equal, round, and reactive to light.   Cardiovascular:      Rate and Rhythm: Normal rate and regular rhythm.      Pulses: Normal pulses.      Heart sounds: Normal heart sounds. No murmur heard.  Pulmonary:      Effort: Pulmonary effort is normal.      Breath sounds: Normal breath sounds. No wheezing or rales.   Abdominal:      General: Abdomen is flat. Bowel sounds are normal.      Palpations: Abdomen is soft.      Hernia: No hernia is present.   Musculoskeletal:         General: No swelling or tenderness. Normal range of motion.      Cervical back: Normal range of motion and neck supple.      Right lower leg: No edema.      Left lower leg: No edema.   Skin:     General: Skin is warm and dry.      Capillary Refill: Capillary refill takes less than 2 seconds.      Findings: No rash.   Neurological:      General: No focal deficit present.      Mental Status: She is alert and oriented to person, place, and time.   Psychiatric:         Mood and Affect: Mood normal.         Behavior: Behavior normal.         Assessment/Plan        Lumbar Degenerative Disk Disease:  pain control , tramadol prn , refilled , # 60   OARRS reviewed    otc analgReviewed Controlled Substance Agreement including but not limited to the benefits, risk, and alternatives to treatment with a Controlled Substance medication(s)              : I have personally reviewed the [facility]rs  report on this patient.   I have considered the risks of abuse dependence addiction and diversion.             I believe it is clinically appropriate for this patient to be prescribed the medicationsesics , Tizanidine prn  s/p  Lumbar spine surgery on 4/19/22      Fibromyalgia:  otc  analgesics  increase activity,exercises  rec. PT and water therapy , she goes to [facility]    Knee joint OA:  S/p bilateral TKA  Orthopedic followup  with   Dr Simeon at [facility]   pain control , regular activity    no current issues , pain or mobility         Polyneuropathy:  Reduce gabapentin to 600mg po tid    COPD:   Proair inhaler  PFT's  advised  CT Chest reviewed   Pulmonology followup,  Dr Martir Valencia at [facility]  stop smoking     Nicotine dependence::  smoking cessation , counselled again  FH : Mom  of lung CA     Iron Deficiency Anemia:    Iron po   EGD done , 23   and Colonoscopy per Dr Leary , she has Insurance issues    Insomnia:  Temazepam at hs prn  OARRS reviewed    Reviewed Controlled Substance Agreement including but not limited to the benefits, risk, and alternatives to treatment with a Controlled Substance medication(s)              : I have personally reviewed the O[facility]rs  report on this patient.   I have considered the risks of abuse dependence addiction and diversion.             I believe it is clinically appropriate for this patient to be prescribed the medications

## 2024-08-22 DIAGNOSIS — G47.00 INSOMNIA, UNSPECIFIED TYPE: ICD-10-CM

## 2024-08-22 NOTE — TELEPHONE ENCOUNTER
temazepam (RestoriL) 15 mg capsule       [id number] - [place], [place] - [address]  [address] [address]  Phone: [phone]  Fax: [phone]  DUSTIN #: [id number]

## 2024-08-23 RX ORDER — TEMAZEPAM 15 MG/1
15 CAPSULE ORAL NIGHTLY PRN
Qty: 90 CAPSULE | Refills: 0 | Status: SHIPPED | OUTPATIENT
Start: 2024-08-23

## 2024-08-30 ENCOUNTER — APPOINTMENT (OUTPATIENT)
Dept: PRIMARY CARE | Facility: CLINIC | Age: 64
End: 2024-08-30
Payer: MEDICAID

## 2024-09-13 ENCOUNTER — OFFICE VISIT (OUTPATIENT)
Dept: PRIMARY CARE | Facility: CLINIC | Age: 64
End: 2024-09-13
Payer: MEDICARE

## 2024-09-13 VITALS
WEIGHT: 90 LBS | BODY MASS INDEX: 14.99 KG/M2 | OXYGEN SATURATION: 98 % | DIASTOLIC BLOOD PRESSURE: 85 MMHG | HEIGHT: 65 IN | HEART RATE: 102 BPM | SYSTOLIC BLOOD PRESSURE: 148 MMHG

## 2024-09-13 DIAGNOSIS — M51.36 DEGENERATION, INTERVERTEBRAL DISC, LUMBAR: ICD-10-CM

## 2024-09-13 DIAGNOSIS — J43.9 PULMONARY EMPHYSEMA, UNSPECIFIED EMPHYSEMA TYPE (MULTI): ICD-10-CM

## 2024-09-13 DIAGNOSIS — G47.00 INSOMNIA, UNSPECIFIED TYPE: ICD-10-CM

## 2024-09-13 DIAGNOSIS — G62.9 NEUROPATHY: ICD-10-CM

## 2024-09-13 DIAGNOSIS — Z12.31 ENCOUNTER FOR SCREENING MAMMOGRAM FOR MALIGNANT NEOPLASM OF BREAST: Primary | ICD-10-CM

## 2024-09-13 DIAGNOSIS — M15.9 POLYARTHROSIS: ICD-10-CM

## 2024-09-13 RX ORDER — LIDOCAINE 50 MG/G
1 PATCH TOPICAL DAILY
Qty: 30 PATCH | Refills: 3 | Status: SHIPPED | OUTPATIENT
Start: 2024-09-13

## 2024-09-13 RX ORDER — GABAPENTIN 600 MG/1
600 TABLET ORAL 3 TIMES DAILY
Qty: 90 TABLET | Refills: 2 | Status: SHIPPED | OUTPATIENT
Start: 2024-09-13

## 2024-09-13 RX ORDER — TIZANIDINE 4 MG/1
4 TABLET ORAL DAILY PRN
Qty: 30 TABLET | Refills: 1 | Status: SHIPPED | OUTPATIENT
Start: 2024-09-13

## 2024-09-13 RX ORDER — TEMAZEPAM 15 MG/1
15 CAPSULE ORAL NIGHTLY PRN
Qty: 90 CAPSULE | Refills: 0 | Status: SHIPPED | OUTPATIENT
Start: 2024-09-13

## 2024-09-13 RX ORDER — TRAMADOL HYDROCHLORIDE 50 MG/1
50 TABLET ORAL 2 TIMES DAILY PRN
Qty: 60 TABLET | Refills: 0 | Status: SHIPPED | OUTPATIENT
Start: 2024-09-13 | End: 2024-10-13

## 2024-09-13 ASSESSMENT — ENCOUNTER SYMPTOMS
PALPITATIONS: 0
ENDOCRINE NEGATIVE: 1
DYSURIA: 0
ADENOPATHY: 0
ACTIVITY CHANGE: 0
AGITATION: 0
EYE REDNESS: 0
DIZZINESS: 0
BACK PAIN: 1
ALLERGIC/IMMUNOLOGIC NEGATIVE: 1
BLOOD IN STOOL: 0
FREQUENCY: 0
CONSTIPATION: 0

## 2024-09-13 NOTE — PROGRESS NOTES
Subjective   Patient ID: Mariann Mock is a 64 y.o. female who presents for Follow-up and Med Refill.  And Medicare Wellness  She is doing well, feels fine.She denies fever , nausea , sputum , dyspnea , dizziness , palpitations.She is taking care of many grandchildren. She has familial stress .She does not want epidural inj. in her back No dysuria , bleeding. She has some constipation , otc meds help.  She has diffuse musculoskeletal pains. She  had previous knee surgery by Dr Simeon.She takes gabapentin daily for numbness and paresthesia. She has mild urinary incontinence.  She takes tramadol prn for  moderate pain  She had CT chest , which shows evidence of emphysema, she has long h/o smoking.  She is actively trying to quit smoking, down to 2 cig / week  and should  see Pulmonary at [facility] .   She has anemia, and advised to take Iron po daily .   She had EGD  which showed mild antral erythema, and Colonoscopy was rescheduled / pending ( has new Insurance now )     PMH:  Immun/Inj. Record:  90732-Pneumovax 23 10/11/22  08495-Nkg Quadrivalent [address]-04 10/11/22 10/04/18  26036-Zyocect Regular (3And Over) .5 ml [id number] 23  Health Maintenance:  Covid Vax - 3 COVID SHOTS  Lumbar DJD , OA both knees , Neuropathy , fibromyalgia , Overactive bladder , Carpaltunnel syndrome   PSH : Cholecystectomy , Appendoctomy , Hysterectomy , arthroscopy both knees and TKA both knees per Dr Glynn Simeon , Lumbar spine surgery - Dr Marley  Surgical Hx:  Cataract Removal - ()  - right eye,   - left eye  Lumbar spine surgery - Dr Marley  Knee Replacement - Bilateral , Dr Glynn Simeon    FH:      Mother -  of lung CA , Father - of MI.    SH:  Personal Habits:  Cigarette Use: Light tobacco smoker (10 or fewer cigarettes/day).Alcohol: Denies alcohol use.      Review of Systems   Constitutional:  Negative for activity change.   Eyes:  Negative for redness and visual disturbance.   Cardiovascular:   "Negative for palpitations and leg swelling.   Gastrointestinal:  Negative for blood in stool and constipation.   Endocrine: Negative.    Genitourinary:  Negative for dysuria, frequency and urgency.   Musculoskeletal:  Positive for back pain.   Allergic/Immunologic: Negative.    Neurological:  Negative for dizziness.   Hematological:  Negative for adenopathy.   Psychiatric/Behavioral:  Negative for agitation and behavioral problems.        Objective   /85 (BP Location: Left arm, Patient Position: Sitting, BP Cuff Size: Adult)   Pulse 102   Ht 1.651 m (5' 5\")   Wt (!) 40.8 kg (90 lb)   SpO2 98%   BMI 14.98 kg/m²     Physical Exam  Constitutional:       Appearance: Normal appearance.   HENT:      Head: Normocephalic and atraumatic.      Right Ear: Tympanic membrane and external ear normal.      Left Ear: Tympanic membrane and external ear normal.      Nose: No congestion.      Mouth/Throat:      Mouth: Mucous membranes are moist.      Pharynx: Oropharynx is clear.   Eyes:      Extraocular Movements: Extraocular movements intact.      Conjunctiva/sclera: Conjunctivae normal.      Pupils: Pupils are equal, round, and reactive to light.   Cardiovascular:      Rate and Rhythm: Normal rate and regular rhythm.      Pulses: Normal pulses.      Heart sounds: Normal heart sounds. No murmur heard.  Pulmonary:      Effort: Pulmonary effort is normal.      Breath sounds: Normal breath sounds. No wheezing or rales.   Abdominal:      General: Abdomen is flat. Bowel sounds are normal.      Palpations: Abdomen is soft.      Hernia: No hernia is present.   Musculoskeletal:         General: No swelling or tenderness. Normal range of motion.      Cervical back: Normal range of motion and neck supple.      Right lower leg: No edema.      Left lower leg: No edema.   Skin:     General: Skin is warm and dry.      Capillary Refill: Capillary refill takes less than 2 seconds.      Findings: No rash.   Neurological:      General: No " focal deficit present.      Mental Status: She is alert and oriented to person, place, and time.   Psychiatric:         Mood and Affect: Mood normal.         Behavior: Behavior normal.         Assessment/Plan        Lumbar Degenerative Disk Disease:  pain control , tramadol prn , refilled , # 60   OARRS reviewed    otc analgReviewed Controlled Substance Agreement including but not limited to the benefits, risk, and alternatives to treatment with a Controlled Substance medication(s)              : I have personally reviewed the Oarrs  report on this patient.   I have considered the risks of abuse dependence addiction and diversion.             I believe it is clinically appropriate for this patient to be prescribed the medicationsesics , Tizanidine prn  s/p  Lumbar spine surgery on 22      Fibromyalgia:  otc analgesics  increase activity,exercises  rec. PT and water therapy , she goes to [facility]    Knee joint OA:  S/p bilateral TKA  Orthopedic followup  with   Dr Simeon at [facility]   pain control , regular activity    no current issues , pain or mobility         Polyneuropathy:  Reduce gabapentin to 600mg po tid    COPD:   Proair inhaler  PFT's  advised  CT Chest reviewed   Pulmonology followup,  Dr Martir Valencia at [facility]  stop smoking     Nicotine dependence::  smoking cessation , counselled again  FH : Mom  of lung CA     Iron Deficiency Anemia:    Iron po   EGD done , 24   and Colonoscopy per Dr Leary , she has Insurance issues    Insomnia:  Temazepam at hs prn  OARRS reviewed    Reviewed Controlled Substance Agreement including but not limited to the benefits, risk, and alternatives to treatment with a Controlled Substance medication(s)              : I have personally reviewed the Oarrs  report on this patient.   I have considered the risks of abuse dependence addiction and diversion.             I believe it is clinically appropriate for this patient to be prescribed the medications    Vision  check  Flushot, Covid booster at pharmacy  Colonoscopy survey , reminder  She had pneumovax  Mammogram yearly        Tobacco/Alcohol/Opioid use, as well as Illicit Drug Use was screened for/reviewed and documented in Social Documentation section of the chart and medication list as appropriate    Allergies and Medications  Morphine, Naproxen, and Sulfa (sulfonamide antibiotics)  Current Outpatient Medications   Medication Instructions    albuterol (Ventolin HFA) 90 mcg/actuation inhaler 2 puffs, inhalation, Every 6 hours PRN    cetirizine (ZYRTEC) 10 mg, oral, Daily    cholecalciferol (VITAMIN D-3) 2,000 Units, oral, Daily    ferrous sulfate 325 (65 Fe) MG tablet oral, Daily    fluticasone (Flonase) 50 mcg/actuation nasal spray 2 sprays, nasal, Daily    gabapentin (NEURONTIN) 600 mg, oral, 3 times daily    lidocaine (Lidoderm) 5 % patch 1 patch, transdermal, Daily    montelukast (SINGULAIR) 10 mg, oral, Daily    naloxone (NARCAN) 4 mg, nasal, As needed, May repeat every 2-3 minutes if needed, alternating nostrils, until medical assistance becomes available.    naproxen-irr cntr irrit cmb #2 (Comfort Pac-Naproxen) 500 mg kit 1 tablet, oral, 2 times daily    nicotine (Nicoderm CQ) 14 mg/24 hr patch 1 patch, transdermal, Daily    oxybutynin XL (DITROPAN XL) 5 mg, oral, Daily    pantoprazole (PROTONIX) 40 mg, oral, Daily    sod chloride-sodium bicarb (Sinus Rinse Starter) nasal rinse nasal, 2 times daily    temazepam (RESTORIL) 15 mg, oral, Nightly PRN    tiZANidine (ZANAFLEX) 4 mg, oral, Daily PRN    traMADol (ULTRAM) 50 mg, oral, 2 times daily PRN    varenicline (CHANTIX) 1 mg, oral, 2 times daily     Medications and Supplements  prescribed by me and other practitioners or clinical pharmacist (such as prescriptions, OTC's, herbal therapies and supplements) were reviewed and documented in the medical record.      Activities of Daily Living  In your present state of health, do you have any difficulty performing the  following activities?:   Preparing food and eating?: Yes  Bathing yourself: Yes  Getting dressed: Yes  Using the toilet:Yes  Moving around from place to place: Yes  In the past year have you fallen or had a near fall?:No  Able to manage finances independently: Yes  Able to perform grocery shopping: Yes  Able to manage medications independently: Yes  Able to do housework independently: Yes  Patient self-assessment of health status? Good    Depression Screen  Depression screening (15m) completed using the PHQ-2 questions with results documented in the chart/encounter  (See note and/or Rooming Screenings for documentation)    Current exercise habits: remains fairly active   Dietary issues discussed: Yes  Hearing difficulties: No  Safe in current home environment: Yes  Visual Acuity assessed: No  Cognitive Impairment No    Advance directives  Advanced Care Planning (including a Living Will, Healthcare POA, as well as specific end of life choices and/or directives), was discussed (~16 min) with the patient and/or surrogate, voluntarily, and details of that discussion documented in the Problem List (under Advanced Directives Discussion) of the medical record.   Advised to get  Living Will

## 2024-09-16 ENCOUNTER — TELEPHONE (OUTPATIENT)
Dept: PRIMARY CARE | Facility: CLINIC | Age: 64
End: 2024-09-16
Payer: MEDICARE

## 2024-10-14 DIAGNOSIS — K21.9 GASTROESOPHAGEAL REFLUX DISEASE WITHOUT ESOPHAGITIS: Primary | ICD-10-CM

## 2024-10-14 RX ORDER — PANTOPRAZOLE SODIUM 40 MG/1
40 TABLET, DELAYED RELEASE ORAL DAILY
Qty: 30 TABLET | Refills: 0 | Status: SHIPPED | OUTPATIENT
Start: 2024-10-14

## 2024-10-28 ENCOUNTER — APPOINTMENT (OUTPATIENT)
Dept: PRIMARY CARE | Facility: CLINIC | Age: 64
End: 2024-10-28
Payer: MEDICARE

## 2024-10-28 VITALS
DIASTOLIC BLOOD PRESSURE: 62 MMHG | SYSTOLIC BLOOD PRESSURE: 118 MMHG | HEIGHT: 65 IN | WEIGHT: 89.2 LBS | TEMPERATURE: 97 F | BODY MASS INDEX: 14.86 KG/M2

## 2024-10-28 DIAGNOSIS — G47.00 INSOMNIA, UNSPECIFIED TYPE: ICD-10-CM

## 2024-10-28 DIAGNOSIS — G62.9 NEUROPATHY: ICD-10-CM

## 2024-10-28 DIAGNOSIS — M15.9 POLYARTHROSIS: ICD-10-CM

## 2024-10-28 DIAGNOSIS — J43.9 PULMONARY EMPHYSEMA, UNSPECIFIED EMPHYSEMA TYPE (MULTI): ICD-10-CM

## 2024-10-28 DIAGNOSIS — M51.369 DEGENERATION, INTERVERTEBRAL DISC, LUMBAR: ICD-10-CM

## 2024-10-28 PROCEDURE — 3008F BODY MASS INDEX DOCD: CPT | Performed by: INTERNAL MEDICINE

## 2024-10-28 PROCEDURE — 99214 OFFICE O/P EST MOD 30 MIN: CPT | Performed by: INTERNAL MEDICINE

## 2024-10-28 PROCEDURE — 1159F MED LIST DOCD IN RCRD: CPT | Performed by: INTERNAL MEDICINE

## 2024-10-28 RX ORDER — GABAPENTIN 600 MG/1
600 TABLET ORAL 3 TIMES DAILY
Qty: 90 TABLET | Refills: 2 | Status: SHIPPED | OUTPATIENT
Start: 2024-10-28

## 2024-10-28 RX ORDER — TRAMADOL HYDROCHLORIDE 50 MG/1
50 TABLET ORAL 2 TIMES DAILY PRN
Qty: 60 TABLET | Refills: 0 | Status: SHIPPED | OUTPATIENT
Start: 2024-10-28 | End: 2024-11-27

## 2024-10-28 RX ORDER — TEMAZEPAM 15 MG/1
15 CAPSULE ORAL NIGHTLY PRN
Qty: 90 CAPSULE | Refills: 0 | Status: SHIPPED | OUTPATIENT
Start: 2024-10-28

## 2024-10-28 ASSESSMENT — ENCOUNTER SYMPTOMS
FREQUENCY: 0
AGITATION: 0
CONSTIPATION: 0
BLOOD IN STOOL: 0
ALLERGIC/IMMUNOLOGIC NEGATIVE: 1
DIZZINESS: 0
BACK PAIN: 1
ACTIVITY CHANGE: 0
PALPITATIONS: 0
ADENOPATHY: 0
DYSURIA: 0
EYE REDNESS: 0
ENDOCRINE NEGATIVE: 1

## 2024-11-25 DIAGNOSIS — M15.9 POLYARTHROSIS: ICD-10-CM

## 2024-11-25 DIAGNOSIS — M51.369 DEGENERATION, INTERVERTEBRAL DISC, LUMBAR: ICD-10-CM

## 2024-11-25 DIAGNOSIS — J43.9 PULMONARY EMPHYSEMA, UNSPECIFIED EMPHYSEMA TYPE (MULTI): ICD-10-CM

## 2024-11-25 RX ORDER — TRAMADOL HYDROCHLORIDE 50 MG/1
50 TABLET ORAL 2 TIMES DAILY PRN
Qty: 60 TABLET | Refills: 0 | Status: CANCELLED | OUTPATIENT
Start: 2024-11-25 | End: 2024-12-25

## 2024-11-26 DIAGNOSIS — J43.9 PULMONARY EMPHYSEMA, UNSPECIFIED EMPHYSEMA TYPE (MULTI): ICD-10-CM

## 2024-11-26 DIAGNOSIS — M15.9 POLYARTHROSIS: ICD-10-CM

## 2024-11-26 DIAGNOSIS — M51.369 DEGENERATION, INTERVERTEBRAL DISC, LUMBAR: ICD-10-CM

## 2024-11-26 RX ORDER — TRAMADOL HYDROCHLORIDE 50 MG/1
50 TABLET ORAL 2 TIMES DAILY PRN
Qty: 60 TABLET | Refills: 0 | Status: SHIPPED | OUTPATIENT
Start: 2024-11-26 | End: 2024-12-26

## 2024-11-26 RX ORDER — TIZANIDINE 4 MG/1
4 TABLET ORAL DAILY PRN
Qty: 30 TABLET | Refills: 1 | Status: SHIPPED | OUTPATIENT
Start: 2024-11-26

## 2024-11-27 NOTE — PROGRESS NOTES
1--pgs refilled tizanidine.   2--what is her allergy to tramadol:  it should be modified.    3--oarrs shows #60 pills written and filled 10/28/2024 so i will refill now.

## 2024-12-27 ENCOUNTER — APPOINTMENT (OUTPATIENT)
Dept: PRIMARY CARE | Facility: CLINIC | Age: 64
End: 2024-12-27
Payer: MEDICARE

## 2024-12-31 ENCOUNTER — APPOINTMENT (OUTPATIENT)
Dept: PRIMARY CARE | Facility: CLINIC | Age: 64
End: 2024-12-31
Payer: MEDICARE

## 2025-01-13 ENCOUNTER — TELEMEDICINE (OUTPATIENT)
Dept: PRIMARY CARE | Facility: CLINIC | Age: 65
End: 2025-01-13
Payer: MEDICARE

## 2025-01-13 DIAGNOSIS — J43.9 PULMONARY EMPHYSEMA, UNSPECIFIED EMPHYSEMA TYPE (MULTI): ICD-10-CM

## 2025-01-13 DIAGNOSIS — K21.9 GASTROESOPHAGEAL REFLUX DISEASE WITHOUT ESOPHAGITIS: ICD-10-CM

## 2025-01-13 DIAGNOSIS — G47.00 INSOMNIA, UNSPECIFIED TYPE: ICD-10-CM

## 2025-01-13 DIAGNOSIS — M15.9 POLYARTHROSIS: ICD-10-CM

## 2025-01-13 DIAGNOSIS — M51.369 DEGENERATION, INTERVERTEBRAL DISC, LUMBAR: ICD-10-CM

## 2025-01-13 RX ORDER — ALBUTEROL SULFATE 90 UG/1
2 INHALANT RESPIRATORY (INHALATION) EVERY 6 HOURS PRN
Qty: 18 G | Refills: 1 | Status: SHIPPED | OUTPATIENT
Start: 2025-01-13

## 2025-01-13 RX ORDER — TRAMADOL HYDROCHLORIDE 50 MG/1
50 TABLET ORAL 2 TIMES DAILY PRN
Qty: 30 TABLET | Refills: 0 | Status: SHIPPED | OUTPATIENT
Start: 2025-01-13

## 2025-01-13 RX ORDER — TEMAZEPAM 15 MG/1
15 CAPSULE ORAL NIGHTLY PRN
Qty: 30 CAPSULE | Refills: 0 | Status: SHIPPED | OUTPATIENT
Start: 2025-01-13

## 2025-01-13 RX ORDER — PANTOPRAZOLE SODIUM 40 MG/1
40 TABLET, DELAYED RELEASE ORAL DAILY
Qty: 30 TABLET | Refills: 0 | Status: SHIPPED | OUTPATIENT
Start: 2025-01-13

## 2025-01-13 NOTE — PROGRESS NOTES
Virtual or Telephone Consent    An interactive audio and video telecommunication system which permits real time communications between the patient (at the originating site) and provider (at the distant site) was utilized to provide this telehealth service.   Verbal consent was requested and obtained from Mariann Mock on this date, 01/13/25 for a telehealth visit.     Subjective   Patient ID: Mariann Mock is a 64 y.o. female who presents for nausea , vomiting and diarrhoea      HPI   She is feeling sick over weekend, with acute gastroenteritis , with nausea , vomiting and loose BM . Denies fever, chills, sorethroat, cough dizziness, dyspnea. She felt weak but now she is able to eat and drink   She did not go to ED for eval or tests.     Review of Systems  See HPI      Physical Exam  This is Tele phonecall visit    Assessment/Plan        Acute viral syndrome / GE    [place] diet, hydration / increase po fluids  Rec. Testing for viral pathogens, Influenza , Covid etc. , she refuses  PPI, Imodium prn     Temazepam, Tramadol refilled for 2 week supply , OARRS reviewed

## 2025-05-13 ENCOUNTER — OFFICE VISIT (OUTPATIENT)
Dept: PRIMARY CARE | Facility: CLINIC | Age: 65
End: 2025-05-13
Payer: COMMERCIAL

## 2025-05-13 VITALS
RESPIRATION RATE: 18 BRPM | WEIGHT: 87.6 LBS | TEMPERATURE: 95.3 F | HEART RATE: 94 BPM | OXYGEN SATURATION: 99 % | DIASTOLIC BLOOD PRESSURE: 80 MMHG | BODY MASS INDEX: 14.59 KG/M2 | HEIGHT: 65 IN | SYSTOLIC BLOOD PRESSURE: 124 MMHG

## 2025-05-13 DIAGNOSIS — K21.9 GASTROESOPHAGEAL REFLUX DISEASE WITHOUT ESOPHAGITIS: ICD-10-CM

## 2025-05-13 DIAGNOSIS — M15.9 POLYARTHROSIS: ICD-10-CM

## 2025-05-13 DIAGNOSIS — F17.209 NICOTINE DEPENDENCE WITH NICOTINE-INDUCED DISORDER, UNSPECIFIED NICOTINE PRODUCT TYPE: ICD-10-CM

## 2025-05-13 DIAGNOSIS — J43.9 PULMONARY EMPHYSEMA, UNSPECIFIED EMPHYSEMA TYPE (MULTI): Primary | ICD-10-CM

## 2025-05-13 DIAGNOSIS — N32.81 OVERACTIVE BLADDER: ICD-10-CM

## 2025-05-13 DIAGNOSIS — G47.00 INSOMNIA, UNSPECIFIED TYPE: ICD-10-CM

## 2025-05-13 DIAGNOSIS — M51.369 DEGENERATION, INTERVERTEBRAL DISC, LUMBAR: ICD-10-CM

## 2025-05-13 DIAGNOSIS — G62.9 NEUROPATHY: ICD-10-CM

## 2025-05-13 PROCEDURE — 3008F BODY MASS INDEX DOCD: CPT | Performed by: INTERNAL MEDICINE

## 2025-05-13 PROCEDURE — 1159F MED LIST DOCD IN RCRD: CPT | Performed by: INTERNAL MEDICINE

## 2025-05-13 PROCEDURE — 99214 OFFICE O/P EST MOD 30 MIN: CPT | Performed by: INTERNAL MEDICINE

## 2025-05-13 PROCEDURE — 1036F TOBACCO NON-USER: CPT | Performed by: INTERNAL MEDICINE

## 2025-05-13 RX ORDER — GABAPENTIN 600 MG/1
600 TABLET ORAL 2 TIMES DAILY
Qty: 60 TABLET | Refills: 2 | Status: SHIPPED | OUTPATIENT
Start: 2025-05-13

## 2025-05-13 RX ORDER — OXYBUTYNIN CHLORIDE 5 MG/1
5 TABLET, EXTENDED RELEASE ORAL DAILY
Qty: 30 TABLET | Refills: 3 | Status: SHIPPED | OUTPATIENT
Start: 2025-05-13

## 2025-05-13 RX ORDER — ALBUTEROL SULFATE 90 UG/1
2 INHALANT RESPIRATORY (INHALATION) EVERY 6 HOURS PRN
Qty: 18 G | Refills: 1 | Status: SHIPPED | OUTPATIENT
Start: 2025-05-13

## 2025-05-13 RX ORDER — TRAMADOL HYDROCHLORIDE 50 MG/1
50 TABLET ORAL 2 TIMES DAILY PRN
Qty: 30 TABLET | Refills: 0 | Status: SHIPPED | OUTPATIENT
Start: 2025-05-13

## 2025-05-13 RX ORDER — IBUPROFEN 200 MG
1 TABLET ORAL DAILY
Qty: 30 PATCH | Refills: 3 | Status: SHIPPED | OUTPATIENT
Start: 2025-05-13

## 2025-05-13 RX ORDER — TEMAZEPAM 15 MG/1
15 CAPSULE ORAL NIGHTLY PRN
Qty: 30 CAPSULE | Refills: 0 | Status: SHIPPED | OUTPATIENT
Start: 2025-05-13

## 2025-05-13 RX ORDER — TIZANIDINE 4 MG/1
4 TABLET ORAL DAILY PRN
Qty: 30 TABLET | Refills: 1 | Status: SHIPPED | OUTPATIENT
Start: 2025-05-13

## 2025-05-13 RX ORDER — PANTOPRAZOLE SODIUM 40 MG/1
40 TABLET, DELAYED RELEASE ORAL DAILY
Qty: 30 TABLET | Refills: 0 | Status: SHIPPED | OUTPATIENT
Start: 2025-05-13

## 2025-05-13 ASSESSMENT — ENCOUNTER SYMPTOMS
DIZZINESS: 0
PALPITATIONS: 0
EYE REDNESS: 0
AGITATION: 0
BLOOD IN STOOL: 0
CONSTIPATION: 0
ADENOPATHY: 0
DYSURIA: 0
ENDOCRINE NEGATIVE: 1
BACK PAIN: 1
ACTIVITY CHANGE: 0
ALLERGIC/IMMUNOLOGIC NEGATIVE: 1
FREQUENCY: 0

## 2025-05-13 ASSESSMENT — PATIENT HEALTH QUESTIONNAIRE - PHQ9
2. FEELING DOWN, DEPRESSED OR HOPELESS: NEARLY EVERY DAY
SUM OF ALL RESPONSES TO PHQ9 QUESTIONS 1 AND 2: 6
1. LITTLE INTEREST OR PLEASURE IN DOING THINGS: NEARLY EVERY DAY

## 2025-05-13 NOTE — PROGRESS NOTES
Subjective   Patient ID: Mariann Mock is a 65 y.o. female who presents for routine follow up    She is doing well, feels fine.She denies fever , nausea , sputum , dyspnea , dizziness , palpitations.She is taking care of many grandchildren. She has familial stress .She does not want epidural inj. in her back No dysuria , bleeding. She has some constipation , otc meds help.  She has diffuse musculoskeletal pains. She  had previous knee surgery by Dr Simeon.She takes gabapentin daily for numbness and paresthesia. She has mild urinary incontinence.  She takes tramadol prn for  moderate pain  She had CT chest , which shows evidence of emphysema, she has long h/o smoking.  She is actively trying to quit smoking, down to 2 cig / week  and should  see Pulmonary at [facility] .   She has anemia, and advised to take Iron po daily .   She had EGD  which showed mild antral erythema, and Colonoscopy was rescheduled / pending ( has new Insurance now )     PMH:  Immun/Inj. Record:  90732-Pneumovax 23 10/11/22  54406-Avl Quadrivalent [address]-72 10/11/22 10/04/18  05874-Klrmvsz Regular (3And Over) .5 ml [id number] 23  Health Maintenance:  Covid Vax - 3 COVID SHOTS  Lumbar DJD , OA both knees , Neuropathy , fibromyalgia , Overactive bladder , Carpaltunnel syndrome   PSH : Cholecystectomy , Appendoctomy , Hysterectomy , arthroscopy both knees and TKA both knees per Dr Glynn Simeon , Lumbar spine surgery - Dr Marley  Surgical Hx:  Cataract Removal - ()  - right eye,   - left eye  Lumbar spine surgery - Dr Marley  Knee Replacement - Bilateral , Dr Glynn Simeon    FH:  Mother -  of lung CA , Father - of MI.    SH:  Personal Habits:  Cigarette Use: Light tobacco smoker (10 or fewer cigarettes/day).  Alcohol: Denies alcohol use.      Review of Systems   Constitutional:  Negative for activity change.   Eyes:  Negative for redness and visual disturbance.   Cardiovascular:  Negative for palpitations and leg  "swelling.   Gastrointestinal:  Negative for blood in stool and constipation.   Endocrine: Negative.    Genitourinary:  Negative for dysuria, frequency and urgency.   Musculoskeletal:  Positive for back pain.   Allergic/Immunologic: Negative.    Neurological:  Negative for dizziness.   Hematological:  Negative for adenopathy.   Psychiatric/Behavioral:  Negative for agitation and behavioral problems.        Objective   /80 (BP Location: Right arm, Patient Position: Sitting, BP Cuff Size: Adult)   Pulse 94   Temp 35.2 °C (95.3 °F) (Temporal)   Resp 18   Ht 1.651 m (5' 5\")   Wt (!) 39.7 kg (87 lb 9.6 oz)   SpO2 99%   BMI 14.58 kg/m²     Physical Exam  Constitutional:       Appearance: Normal appearance.   HENT:      Nose: No congestion.      Mouth/Throat:      Mouth: Mucous membranes are moist.      Pharynx: Oropharynx is clear.   Eyes:      Conjunctiva/sclera: Conjunctivae normal.   Cardiovascular:      Rate and Rhythm: Normal rate and regular rhythm.      Pulses: Normal pulses.      Heart sounds: Normal heart sounds. No murmur heard.  Pulmonary:      Effort: Pulmonary effort is normal.      Breath sounds: Normal breath sounds. No wheezing or rales.   Abdominal:      General: Abdomen is flat. Bowel sounds are normal.      Palpations: Abdomen is soft.      Hernia: No hernia is present.   Musculoskeletal:         General: No swelling or tenderness. Normal range of motion.      Cervical back: Normal range of motion and neck supple.      Right lower leg: No edema.      Left lower leg: No edema.   Skin:     General: Skin is warm and dry.      Capillary Refill: Capillary refill takes less than 2 seconds.      Findings: No rash.   Neurological:      General: No focal deficit present.      Mental Status: She is alert and oriented to person, place, and time.      Motor: No weakness.      Gait: Gait normal.   Psychiatric:         Mood and Affect: Mood normal.         Behavior: Behavior normal. "         Assessment/Plan        Lumbar Degenerative Disk Disease:  pain control , tramadol prn , refilled , # 60 , last fill 25  Gabapentin 600 mg po bid  OARRS reviewed    otc analgReviewed Controlled Substance Agreement including but not limited to the benefits, risk, and alternatives to treatment with a Controlled Substance medication(s)              : I have personally reviewed the Oarrs  report on this patient.   I have considered the risks of abuse dependence addiction and diversion.             I believe it is clinically appropriate for this patient to be prescribed the medicationsesics , Tizanidine prn  s/p  Lumbar spine surgery on 22      Fibromyalgia:  otc analgesics  increase activity,exercises  rec. PT and water therapy , she goes to [facility]    Knee joint OA:  S/p bilateral TKA  Orthopedic followup  with   Dr Simeon at [facility]   pain control , regular activity    no current issues , pain or mobility         Polyneuropathy:  gabapentin to 600mg po  bid    COPD:   Proair inhaler  PFT's  advised  CT Chest reviewed   Pulmonology followup,  Dr Martir Valencia at [facility]  stop smoking     Nicotine dependence::  smoking cessation , counselled again  FH : Mom  of lung CA     Iron Deficiency Anemia:    Iron po   EGD done , 24   and Colonoscopy per Dr Leary , she has Insurance issues    Insomnia:  Temazepam at hs prn  OARRS reviewed    Reviewed Controlled Substance Agreement including but not limited to the benefits, risk, and alternatives to treatment with a Controlled Substance medication(s)              : I have personally reviewed the Oarrs  report on this patient.   I have considered the risks of abuse dependence addiction and diversion.             I believe it is clinically appropriate for this patient to be prescribed the medications    Vision check  Janett Collins booster at pharmacy  Colonoscopy survey , reminder  She had pneumovax  Mammogram yearly

## 2025-06-24 ENCOUNTER — OFFICE VISIT (OUTPATIENT)
Dept: PRIMARY CARE | Facility: CLINIC | Age: 65
End: 2025-06-24
Payer: COMMERCIAL

## 2025-06-24 VITALS
OXYGEN SATURATION: 97 % | HEIGHT: 66 IN | BODY MASS INDEX: 13.82 KG/M2 | SYSTOLIC BLOOD PRESSURE: 100 MMHG | TEMPERATURE: 97.5 F | RESPIRATION RATE: 18 BRPM | WEIGHT: 86 LBS | HEART RATE: 100 BPM | DIASTOLIC BLOOD PRESSURE: 60 MMHG

## 2025-06-24 DIAGNOSIS — M15.9 POLYARTHROSIS: ICD-10-CM

## 2025-06-24 DIAGNOSIS — M51.369 DEGENERATION, INTERVERTEBRAL DISC, LUMBAR: ICD-10-CM

## 2025-06-24 DIAGNOSIS — J43.9 PULMONARY EMPHYSEMA, UNSPECIFIED EMPHYSEMA TYPE (MULTI): ICD-10-CM

## 2025-06-24 DIAGNOSIS — G62.9 NEUROPATHY: ICD-10-CM

## 2025-06-24 PROCEDURE — 1159F MED LIST DOCD IN RCRD: CPT | Performed by: INTERNAL MEDICINE

## 2025-06-24 PROCEDURE — 3008F BODY MASS INDEX DOCD: CPT | Performed by: INTERNAL MEDICINE

## 2025-06-24 PROCEDURE — 1036F TOBACCO NON-USER: CPT | Performed by: INTERNAL MEDICINE

## 2025-06-24 PROCEDURE — 99214 OFFICE O/P EST MOD 30 MIN: CPT | Performed by: INTERNAL MEDICINE

## 2025-06-24 RX ORDER — TIZANIDINE 4 MG/1
4 TABLET ORAL DAILY PRN
Qty: 30 TABLET | Refills: 1 | Status: SHIPPED | OUTPATIENT
Start: 2025-06-24

## 2025-06-24 RX ORDER — GABAPENTIN 600 MG/1
600 TABLET ORAL 2 TIMES DAILY
Qty: 60 TABLET | Refills: 2 | Status: SHIPPED | OUTPATIENT
Start: 2025-06-24

## 2025-06-24 RX ORDER — TRAMADOL HYDROCHLORIDE 50 MG/1
50 TABLET, FILM COATED ORAL 2 TIMES DAILY PRN
Qty: 60 TABLET | Refills: 0 | Status: SHIPPED | OUTPATIENT
Start: 2025-06-24

## 2025-06-24 ASSESSMENT — PATIENT HEALTH QUESTIONNAIRE - PHQ9
2. FEELING DOWN, DEPRESSED OR HOPELESS: NOT AT ALL
SUM OF ALL RESPONSES TO PHQ9 QUESTIONS 1 AND 2: 0
1. LITTLE INTEREST OR PLEASURE IN DOING THINGS: NOT AT ALL

## 2025-06-24 ASSESSMENT — ENCOUNTER SYMPTOMS
AGITATION: 0
BACK PAIN: 1
CONSTIPATION: 0
DIZZINESS: 0
EYE REDNESS: 0
ALLERGIC/IMMUNOLOGIC NEGATIVE: 1
ENDOCRINE NEGATIVE: 1
BLOOD IN STOOL: 0
PALPITATIONS: 0
DYSURIA: 0
ADENOPATHY: 0
FREQUENCY: 0
ACTIVITY CHANGE: 0

## 2025-06-24 NOTE — PROGRESS NOTES
Subjective   Patient ID: Mariann Mock is a 65 y.o. female who presents for routine follow up    She is doing well, feels fine.She denies fever , nausea , sputum , dyspnea , dizziness , palpitations.She is taking care of many grandchildren. She has familial stress .She does not want epidural inj. in her back No dysuria , bleeding. She has some constipation , otc meds help.  She has diffuse musculoskeletal pains. She  had previous knee surgery by Dr Simeon.She takes gabapentin daily for numbness and paresthesia. She has mild urinary incontinence.  She takes tramadol prn for  moderate pain  She had CT chest , which shows evidence of emphysema, she has long h/o smoking.  She is actively trying to quit smoking, down to 2 cig / week  and should  see Pulmonary at [facility] .   She has anemia, and advised to take Iron po daily .   She had EGD  which showed mild antral erythema, and Colonoscopy was rescheduled / pending ( has new Insurance now )  She was moving and lifted boxes, now c/o left upper shoulder area pain, stiffness.      PMH:  Immun/Inj. Record:  90732-Pneumovax 23 10/11/22  35515-Vgu Quadrivalent [address]-24 10/11/22 10/04/18  81734-Ystnmno Regular (3And Over) .5 ml [id number] 23  Health Maintenance:  Covid Vax - 3 COVID SHOTS  Lumbar DJD , OA both knees , Neuropathy , fibromyalgia , Overactive bladder , Carpaltunnel syndrome   PSH : Cholecystectomy , Appendoctomy , Hysterectomy , arthroscopy both knees and TKA both knees per Dr Glynn Simeon , Lumbar spine surgery - Dr Marley  Surgical Hx:  Cataract Removal - ()  - right eye,   - left eye  Lumbar spine surgery - Dr Marley  Knee Replacement - Bilateral , Dr Glynn Simeon    FH:  Mother -  of lung CA , Father - of MI.    SH:  Personal Habits:  Cigarette Use: Light tobacco smoker (10 or fewer cigarettes/day).  Alcohol: Denies alcohol use.      Review of Systems   Constitutional:  Negative for activity change.   Eyes:  Negative for  "redness and visual disturbance.   Cardiovascular:  Negative for palpitations and leg swelling.   Gastrointestinal:  Negative for blood in stool and constipation.   Endocrine: Negative.    Genitourinary:  Negative for dysuria, frequency and urgency.   Musculoskeletal:  Positive for back pain.   Allergic/Immunologic: Negative.    Neurological:  Negative for dizziness.   Hematological:  Negative for adenopathy.   Psychiatric/Behavioral:  Negative for agitation and behavioral problems.        Objective   /60 (BP Location: Left arm, Patient Position: Sitting, BP Cuff Size: Adult)   Pulse 100   Temp 36.4 °C (97.5 °F) (Temporal)   Resp 18   Ht 1.676 m (5' 6\")   Wt (!) 39 kg (86 lb)   SpO2 97%   BMI 13.88 kg/m²     Physical Exam  Constitutional:       Appearance: Normal appearance.   HENT:      Nose: No congestion.      Mouth/Throat:      Mouth: Mucous membranes are moist.      Pharynx: Oropharynx is clear.   Eyes:      Conjunctiva/sclera: Conjunctivae normal.   Cardiovascular:      Rate and Rhythm: Normal rate and regular rhythm.      Pulses: Normal pulses.      Heart sounds: Normal heart sounds. No murmur heard.  Pulmonary:      Effort: Pulmonary effort is normal.      Breath sounds: Normal breath sounds. No wheezing or rales.   Abdominal:      General: Abdomen is flat. Bowel sounds are normal.      Palpations: Abdomen is soft.      Hernia: No hernia is present.   Musculoskeletal:         General: No swelling or tenderness. Normal range of motion.      Cervical back: Normal range of motion and neck supple.      Right lower leg: No edema.      Left lower leg: No edema.   Skin:     General: Skin is warm and dry.      Capillary Refill: Capillary refill takes less than 2 seconds.      Findings: No rash.   Neurological:      General: No focal deficit present.      Mental Status: She is alert and oriented to person, place, and time.      Motor: No weakness.      Gait: Gait normal.   Psychiatric:         Mood and " Affect: Mood normal.         Behavior: Behavior normal.         Assessment/Plan        Lumbar Degenerative Disk Disease:  pain control , tramadol prn , refilled , # 60 , last fill   Gabapentin 600 mg po tid  OARRS reviewed    otc analgReviewed Controlled Substance Agreement including but not limited to the benefits, risk, and alternatives to treatment with a Controlled Substance medication(s)              : I have personally reviewed the Oarrs  report on this patient.   I have considered the risks of abuse dependence addiction and diversion.             I believe it is clinically appropriate for this patient to be prescribed the medicationsesics , Tizanidine prn  s/p  Lumbar spine surgery on 22      Fibromyalgia:  otc analgesics , voltaren gel  increase activity,exercises  rec. PT and water therapy , she goes to [facility]    Knee joint OA:  S/p bilateral TKA  Orthopedic followup  with   Dr Simeon at [facility]   pain control , regular activity    no current issues , pain or mobility         Polyneuropathy:  gabapentin to 600mg po  tid    COPD:   Proair inhaler  PFT's  advised  CT Chest reviewed   Pulmonology followup,  Dr Martir Valencia at [facility]  stop smoking     Nicotine dependence::  smoking cessation , counselled again  FH : Mom  of lung CA     Iron Deficiency Anemia:    Iron po   EGD done , 24   and Colonoscopy per Dr Leary , she has Insurance issues    Insomnia:  Temazepam at  prn  OARRS reviewed    Reviewed Controlled Substance Agreement including but not limited to the benefits, risk, and alternatives to treatment with a Controlled Substance medication(s)              : I have personally reviewed the Oarrs  report on this patient.   I have considered the risks of abuse dependence addiction and diversion.             I believe it is clinically appropriate for this patient to be prescribed the medications    Vision check  Flushot, Covid booster at pharmacy  Colonoscopy survey , reminder -   [place]  She had pneumovax  Mammogram yearly

## 2025-07-22 ENCOUNTER — APPOINTMENT (OUTPATIENT)
Dept: PRIMARY CARE | Facility: CLINIC | Age: 65
End: 2025-07-22
Payer: COMMERCIAL

## 2025-07-24 ENCOUNTER — OFFICE VISIT (OUTPATIENT)
Dept: PRIMARY CARE | Facility: CLINIC | Age: 65
End: 2025-07-24
Payer: COMMERCIAL

## 2025-07-24 VITALS
SYSTOLIC BLOOD PRESSURE: 110 MMHG | HEIGHT: 66 IN | RESPIRATION RATE: 18 BRPM | OXYGEN SATURATION: 99 % | DIASTOLIC BLOOD PRESSURE: 70 MMHG | TEMPERATURE: 97.4 F | HEART RATE: 100 BPM | BODY MASS INDEX: 14.63 KG/M2 | WEIGHT: 91 LBS

## 2025-07-24 DIAGNOSIS — M54.9 DORSALGIA, UNSPECIFIED: ICD-10-CM

## 2025-07-24 DIAGNOSIS — J43.9 PULMONARY EMPHYSEMA, UNSPECIFIED EMPHYSEMA TYPE (MULTI): ICD-10-CM

## 2025-07-24 DIAGNOSIS — G62.9 NEUROPATHY: ICD-10-CM

## 2025-07-24 DIAGNOSIS — N32.81 OVERACTIVE BLADDER: ICD-10-CM

## 2025-07-24 DIAGNOSIS — M51.369 DEGENERATION, INTERVERTEBRAL DISC, LUMBAR: ICD-10-CM

## 2025-07-24 DIAGNOSIS — J30.2 SEASONAL ALLERGIC RHINITIS, UNSPECIFIED TRIGGER: ICD-10-CM

## 2025-07-24 DIAGNOSIS — D50.8 OTHER IRON DEFICIENCY ANEMIA: Primary | ICD-10-CM

## 2025-07-24 DIAGNOSIS — K21.9 GASTROESOPHAGEAL REFLUX DISEASE WITHOUT ESOPHAGITIS: ICD-10-CM

## 2025-07-24 DIAGNOSIS — F11.90 NARCOTIC DRUG USE: ICD-10-CM

## 2025-07-24 DIAGNOSIS — M15.9 POLYARTHROSIS: ICD-10-CM

## 2025-07-24 PROCEDURE — 1159F MED LIST DOCD IN RCRD: CPT | Performed by: INTERNAL MEDICINE

## 2025-07-24 PROCEDURE — 99214 OFFICE O/P EST MOD 30 MIN: CPT | Performed by: INTERNAL MEDICINE

## 2025-07-24 PROCEDURE — 3008F BODY MASS INDEX DOCD: CPT | Performed by: INTERNAL MEDICINE

## 2025-07-24 RX ORDER — PANTOPRAZOLE SODIUM 40 MG/1
40 TABLET, DELAYED RELEASE ORAL DAILY
Qty: 30 TABLET | Refills: 0 | Status: SHIPPED | OUTPATIENT
Start: 2025-07-24

## 2025-07-24 RX ORDER — ALBUTEROL SULFATE 90 UG/1
2 INHALANT RESPIRATORY (INHALATION) EVERY 6 HOURS PRN
Qty: 18 G | Refills: 1 | Status: SHIPPED | OUTPATIENT
Start: 2025-07-24

## 2025-07-24 RX ORDER — TRAMADOL HYDROCHLORIDE 50 MG/1
50 TABLET, FILM COATED ORAL 2 TIMES DAILY PRN
Qty: 60 TABLET | Refills: 0 | Status: SHIPPED | OUTPATIENT
Start: 2025-07-24

## 2025-07-24 RX ORDER — OXYBUTYNIN CHLORIDE 5 MG/1
5 TABLET, EXTENDED RELEASE ORAL DAILY
Qty: 30 TABLET | Refills: 3 | Status: SHIPPED | OUTPATIENT
Start: 2025-07-24

## 2025-07-24 RX ORDER — TIZANIDINE 4 MG/1
4 TABLET ORAL DAILY PRN
Qty: 30 TABLET | Refills: 1 | Status: SHIPPED | OUTPATIENT
Start: 2025-07-24

## 2025-07-24 RX ORDER — FERROUS SULFATE 325(65) MG
325 TABLET ORAL DAILY
Qty: 30 TABLET | Refills: 3 | Status: SHIPPED | OUTPATIENT
Start: 2025-07-24

## 2025-07-24 RX ORDER — MONTELUKAST SODIUM 10 MG/1
10 TABLET ORAL DAILY
Qty: 90 TABLET | Refills: 0 | Status: SHIPPED | OUTPATIENT
Start: 2025-07-24

## 2025-07-24 RX ORDER — GABAPENTIN 600 MG/1
600 TABLET ORAL 2 TIMES DAILY
Qty: 60 TABLET | Refills: 2 | Status: SHIPPED | OUTPATIENT
Start: 2025-07-24

## 2025-07-24 ASSESSMENT — ENCOUNTER SYMPTOMS
AGITATION: 0
FREQUENCY: 0
ALLERGIC/IMMUNOLOGIC NEGATIVE: 1
EYE REDNESS: 0
BLOOD IN STOOL: 0
CONSTIPATION: 0
ENDOCRINE NEGATIVE: 1
ACTIVITY CHANGE: 0
DIZZINESS: 0
DYSURIA: 0
PALPITATIONS: 0
BACK PAIN: 1
ADENOPATHY: 0

## 2025-07-24 ASSESSMENT — PATIENT HEALTH QUESTIONNAIRE - PHQ9
SUM OF ALL RESPONSES TO PHQ9 QUESTIONS 1 AND 2: 0
2. FEELING DOWN, DEPRESSED OR HOPELESS: NOT AT ALL
1. LITTLE INTEREST OR PLEASURE IN DOING THINGS: NOT AT ALL

## 2025-07-24 NOTE — PROGRESS NOTES
"Subjective   Patient ID: Mariann Mock is a 65 y.o. female who presents for routine follow up    She is doing well, feels fine.She denies fever , nausea , sputum , dyspnea , dizziness , palpitations.She is taking care of many grandchildren. She has familial stress .She does not want epidural inj. in her back No dysuria , bleeding. She has some constipation , otc meds help.  She has diffuse musculoskeletal pains. She  had previous knee surgery by Dr Simeon.She takes gabapentin daily for numbness and paresthesia. She has mild urinary incontinence.  She takes tramadol prn for  moderate pain  She had CT chest , which shows evidence of emphysema, she has long h/o smoking.  She is actively trying to quit smoking, down to 2 cig / week  and should  see Pulmonary at [facility] .   She has anemia, and advised to take Iron po daily .   She had EGD  which showed mild antral erythema, and Colonoscopy was rescheduled / pending ( has new Insurance now )          Review of Systems   Constitutional:  Negative for activity change.   Eyes:  Negative for redness and visual disturbance.   Cardiovascular:  Negative for palpitations and leg swelling.   Gastrointestinal:  Negative for blood in stool and constipation.   Endocrine: Negative.    Genitourinary:  Negative for dysuria, frequency and urgency.   Musculoskeletal:  Positive for back pain.   Allergic/Immunologic: Negative.    Neurological:  Negative for dizziness.   Hematological:  Negative for adenopathy.   Psychiatric/Behavioral:  Negative for agitation and behavioral problems.        Objective   /70 (BP Location: Left arm, Patient Position: Sitting, BP Cuff Size: Adult)   Pulse 100   Temp 36.3 °C (97.4 °F) (Temporal)   Resp 18   Ht 1.676 m (5' 6\")   Wt (!) 41.3 kg (91 lb)   SpO2 99%   BMI 14.69 kg/m²     Physical Exam  Constitutional:       Appearance: Normal appearance.   HENT:      Nose: No congestion.      Mouth/Throat:      Mouth: Mucous membranes are moist.      " Pharynx: Oropharynx is clear.     Eyes:      Conjunctiva/sclera: Conjunctivae normal.       Cardiovascular:      Rate and Rhythm: Normal rate and regular rhythm.      Pulses: Normal pulses.      Heart sounds: Normal heart sounds. No murmur heard.  Pulmonary:      Effort: Pulmonary effort is normal.      Breath sounds: Normal breath sounds. No wheezing or rales.   Abdominal:      General: Abdomen is flat. Bowel sounds are normal.      Palpations: Abdomen is soft.      Hernia: No hernia is present.     Musculoskeletal:         General: No swelling or tenderness. Normal range of motion.      Cervical back: Normal range of motion and neck supple.      Right lower leg: No edema.      Left lower leg: No edema.     Skin:     General: Skin is warm and dry.      Capillary Refill: Capillary refill takes less than 2 seconds.      Findings: No rash.     Neurological:      General: No focal deficit present.      Mental Status: She is alert and oriented to person, place, and time.      Motor: No weakness.      Gait: Gait normal.     Psychiatric:         Mood and Affect: Mood normal.         Behavior: Behavior normal.         Assessment/Plan        Lumbar Degenerative Disk Disease:  pain control , tramadol prn , refilled , # 60 , last fill   Gabapentin 600 mg po tid  [facility] reviewed    otc analgReviewed Controlled Substance Agreement including but not limited to the benefits, risk, and alternatives to treatment with a Controlled Substance medication(s)              : I have personally reviewed the [facility]  report on this patient.   I have considered the risks of abuse dependence addiction and diversion.             I believe it is clinically appropriate for this patient to be prescribed the medicationsesics , Tizanidine prn  s/p  Lumbar spine surgery on 4/19/22      Fibromyalgia:  otc analgesics , voltaren gel  increase activity,exercises  rec. PT and water therapy , she goes to [facility]    Knee joint OA:  S/p bilateral TKA  Orthopedic  followup  with   Dr Simeon at [facility]   pain control , regular activity    no current issues , pain or mobility         Polyneuropathy:  gabapentin to 600mg po  tid    COPD:   Proair inhaler  PFT's  advised  CT Chest reviewed   Pulmonology followup,  Dr Martir Valencia at [facility]  stop smoking     Nicotine dependence::  smoking cessation , counselled again  FH : Mom  of lung CA     Iron Deficiency Anemia:    Iron po   EGD done , 23   and Colonoscopy per Dr Leary , she has Insurance issues    Insomnia:  Temazepam at  prn  [facility] reviewed    Reviewed Controlled Substance Agreement including but not limited to the benefits, risk, and alternatives to treatment with a Controlled Substance medication(s)              : I have personally reviewed the [facility]  report on this patient.   I have considered the risks of abuse dependence addiction and diversion.             I believe it is clinically appropriate for this patient to be prescribed the medications    Vision check  Flushot, Covid booster at pharmacy  Colonoscopy survey , reminder - Dr Leayr  She had pneumovax  Mammogram yearly  Urine drug screen ordered

## 2025-08-18 DIAGNOSIS — K21.9 GASTROESOPHAGEAL REFLUX DISEASE WITHOUT ESOPHAGITIS: ICD-10-CM

## 2025-08-18 RX ORDER — PANTOPRAZOLE SODIUM 40 MG/1
40 TABLET, DELAYED RELEASE ORAL DAILY
Qty: 30 TABLET | Refills: 0 | Status: SHIPPED | OUTPATIENT
Start: 2025-08-18

## 2025-08-24 DIAGNOSIS — F11.90 NARCOTIC DRUG USE: ICD-10-CM

## 2025-08-24 DIAGNOSIS — M54.9 DORSALGIA, UNSPECIFIED: ICD-10-CM
